# Patient Record
Sex: MALE | Race: BLACK OR AFRICAN AMERICAN | NOT HISPANIC OR LATINO
[De-identification: names, ages, dates, MRNs, and addresses within clinical notes are randomized per-mention and may not be internally consistent; named-entity substitution may affect disease eponyms.]

---

## 2018-02-14 ENCOUNTER — APPOINTMENT (OUTPATIENT)
Dept: UROLOGY | Facility: CLINIC | Age: 83
End: 2018-02-14
Payer: MEDICARE

## 2018-02-14 VITALS
SYSTOLIC BLOOD PRESSURE: 140 MMHG | RESPIRATION RATE: 16 BRPM | TEMPERATURE: 98.3 F | OXYGEN SATURATION: 98 % | BODY MASS INDEX: 20.94 KG/M2 | WEIGHT: 135 LBS | HEIGHT: 67.5 IN | HEART RATE: 74 BPM | DIASTOLIC BLOOD PRESSURE: 80 MMHG

## 2018-02-14 DIAGNOSIS — Z00.00 ENCOUNTER FOR GENERAL ADULT MEDICAL EXAMINATION W/OUT ABNORMAL FINDINGS: ICD-10-CM

## 2018-02-14 PROCEDURE — 99213 OFFICE O/P EST LOW 20 MIN: CPT

## 2018-02-15 LAB — PSA SERPL-MCNC: <0.01 NG/ML

## 2019-05-23 ENCOUNTER — APPOINTMENT (OUTPATIENT)
Dept: UROLOGY | Facility: CLINIC | Age: 84
End: 2019-05-23
Payer: MEDICARE

## 2019-05-23 VITALS
SYSTOLIC BLOOD PRESSURE: 120 MMHG | TEMPERATURE: 97.8 F | WEIGHT: 130 LBS | BODY MASS INDEX: 20.17 KG/M2 | HEIGHT: 67.5 IN | OXYGEN SATURATION: 98 % | DIASTOLIC BLOOD PRESSURE: 88 MMHG | HEART RATE: 84 BPM

## 2019-05-23 PROCEDURE — 99213 OFFICE O/P EST LOW 20 MIN: CPT

## 2019-05-23 NOTE — ASSESSMENT
[FreeTextEntry1] : PSA level has remained very low with slow doubling. He does not have any other significant complaints. PSA level should be checked every 4- 6 months. He will follow up at that time.\par \par Bret Mcmahon MD, FACS\par [facility]\par  of Urology\par \par [address], [address]\[place] [address]\par \par [address], [address]\[place] [address]\par \par Tel: [phone]\par Fax: [phone]

## 2019-05-23 NOTE — LETTER BODY
[Dear  ___] : Dear  [unfilled], [Attached please find my note.] : Attached please find my note. [Thank you very much for allowing me to participate in the care of this patient. If you have any questions, please do not hesitate to contact me] : Thank you very much for allowing me to participate in the care of this patient. If you have any questions, please do not hesitate to contact me. [FreeTextEntry1] : [facility]\par [address] \par [place], [place], [address]  \par [phone] \par \par

## 2019-05-23 NOTE — HISTORY OF PRESENT ILLNESS
[FreeTextEntry1] : Maciel Sequeira is an 83 year old man who was seen for prostate cancer with biochemical recurrence. He was last seen in February 2018. He spends a lot of time back home, in [place]. Nocturia is 2 or 3 times. There is no hematuria, dysuria or flank pain. Last Lupron injection was given in September 2017. PSA level was 0.85 in May 2019. There is no weight loss or bone pain. Lupron injection was started when PSA level reached 8.\par Previous notes: He is on no medications. In 2005, he underwent XRT for Juju 8 prostate cancer.

## 2019-05-23 NOTE — PHYSICAL EXAM
[General Appearance - Well Developed] : well developed [General Appearance - Well Nourished] : well nourished [Normal Appearance] : normal appearance [Well Groomed] : well groomed [General Appearance - In No Acute Distress] : no acute distress [Abdomen Soft] : soft [Abdomen Tenderness] : non-tender [Costovertebral Angle Tenderness] : no ~M costovertebral angle tenderness [Urethral Meatus] : meatus normal [Urinary Bladder Findings] : the bladder was normal on palpation [Scrotum] : the scrotum was normal [Testes Mass (___cm)] : there were no testicular masses [Prostate Absent] : was absent [] : no respiratory distress [Respiration, Rhythm And Depth] : normal respiratory rhythm and effort [Exaggerated Use Of Accessory Muscles For Inspiration] : no accessory muscle use [Oriented To Time, Place, And Person] : oriented to person, place, and time [Affect] : the affect was normal [Mood] : the mood was normal [Not Anxious] : not anxious

## 2020-03-22 ENCOUNTER — INPATIENT (INPATIENT)
Facility: HOSPITAL | Age: 85
LOS: 13 days | Discharge: HOME | End: 2020-04-05
Attending: STUDENT IN AN ORGANIZED HEALTH CARE EDUCATION/TRAINING PROGRAM | Admitting: STUDENT IN AN ORGANIZED HEALTH CARE EDUCATION/TRAINING PROGRAM
Payer: MEDICARE

## 2020-03-22 VITALS
HEART RATE: 88 BPM | RESPIRATION RATE: 16 BRPM | OXYGEN SATURATION: 95 % | SYSTOLIC BLOOD PRESSURE: 92 MMHG | TEMPERATURE: 99 F | DIASTOLIC BLOOD PRESSURE: 63 MMHG

## 2020-03-22 DIAGNOSIS — R74.0 NONSPECIFIC ELEVATION OF LEVELS OF TRANSAMINASE AND LACTIC ACID DEHYDROGENASE [LDH]: ICD-10-CM

## 2020-03-22 DIAGNOSIS — D72.825 BANDEMIA: ICD-10-CM

## 2020-03-22 DIAGNOSIS — Z29.9 ENCOUNTER FOR PROPHYLACTIC MEASURES, UNSPECIFIED: ICD-10-CM

## 2020-03-22 DIAGNOSIS — J18.9 PNEUMONIA, UNSPECIFIED ORGANISM: ICD-10-CM

## 2020-03-22 DIAGNOSIS — E87.1 HYPO-OSMOLALITY AND HYPONATREMIA: ICD-10-CM

## 2020-03-22 DIAGNOSIS — D64.9 ANEMIA, UNSPECIFIED: ICD-10-CM

## 2020-03-22 DIAGNOSIS — D69.6 THROMBOCYTOPENIA, UNSPECIFIED: ICD-10-CM

## 2020-03-22 DIAGNOSIS — G93.41 METABOLIC ENCEPHALOPATHY: ICD-10-CM

## 2020-03-22 DIAGNOSIS — Z02.9 ENCOUNTER FOR ADMINISTRATIVE EXAMINATIONS, UNSPECIFIED: ICD-10-CM

## 2020-03-22 DIAGNOSIS — J39.8 OTHER SPECIFIED DISEASES OF UPPER RESPIRATORY TRACT: ICD-10-CM

## 2020-03-22 LAB
ALBUMIN SERPL ELPH-MCNC: 3.3 G/DL — SIGNIFICANT CHANGE UP (ref 3.3–5)
ALP SERPL-CCNC: 51 U/L — SIGNIFICANT CHANGE UP (ref 40–120)
ALT FLD-CCNC: 20 U/L — SIGNIFICANT CHANGE UP (ref 4–41)
ANION GAP SERPL CALC-SCNC: 13 MMO/L — SIGNIFICANT CHANGE UP (ref 7–14)
AST SERPL-CCNC: 51 U/L — HIGH (ref 4–40)
B PERT DNA SPEC QL NAA+PROBE: NOT DETECTED — SIGNIFICANT CHANGE UP
BASE EXCESS BLDV CALC-SCNC: -0.4 MMOL/L — SIGNIFICANT CHANGE UP
BASOPHILS # BLD AUTO: 0.01 K/UL — SIGNIFICANT CHANGE UP (ref 0–0.2)
BASOPHILS NFR BLD AUTO: 0.2 % — SIGNIFICANT CHANGE UP (ref 0–2)
BASOPHILS NFR SPEC: 0 % — SIGNIFICANT CHANGE UP (ref 0–2)
BILIRUB SERPL-MCNC: 0.3 MG/DL — SIGNIFICANT CHANGE UP (ref 0.2–1.2)
BLASTS # FLD: 0 % — SIGNIFICANT CHANGE UP (ref 0–0)
BLOOD GAS VENOUS - CREATININE: 1.22 MG/DL — SIGNIFICANT CHANGE UP (ref 0.5–1.3)
BUN SERPL-MCNC: 22 MG/DL — SIGNIFICANT CHANGE UP (ref 7–23)
C PNEUM DNA SPEC QL NAA+PROBE: NOT DETECTED — SIGNIFICANT CHANGE UP
CALCIUM SERPL-MCNC: 7.9 MG/DL — LOW (ref 8.4–10.5)
CHLORIDE BLDV-SCNC: 100 MMOL/L — SIGNIFICANT CHANGE UP (ref 96–108)
CHLORIDE SERPL-SCNC: 98 MMOL/L — SIGNIFICANT CHANGE UP (ref 98–107)
CO2 SERPL-SCNC: 20 MMOL/L — LOW (ref 22–31)
CREAT SERPL-MCNC: 1.17 MG/DL — SIGNIFICANT CHANGE UP (ref 0.5–1.3)
EOSINOPHIL # BLD AUTO: 0 K/UL — SIGNIFICANT CHANGE UP (ref 0–0.5)
EOSINOPHIL NFR BLD AUTO: 0 % — SIGNIFICANT CHANGE UP (ref 0–6)
EOSINOPHIL NFR FLD: 0 % — SIGNIFICANT CHANGE UP (ref 0–6)
FLUAV H1 2009 PAND RNA SPEC QL NAA+PROBE: NOT DETECTED — SIGNIFICANT CHANGE UP
FLUAV H1 RNA SPEC QL NAA+PROBE: NOT DETECTED — SIGNIFICANT CHANGE UP
FLUAV H3 RNA SPEC QL NAA+PROBE: NOT DETECTED — SIGNIFICANT CHANGE UP
FLUAV SUBTYP SPEC NAA+PROBE: NOT DETECTED — SIGNIFICANT CHANGE UP
FLUBV RNA SPEC QL NAA+PROBE: NOT DETECTED — SIGNIFICANT CHANGE UP
GAS PNL BLDV: 130 MMOL/L — LOW (ref 136–146)
GIANT PLATELETS BLD QL SMEAR: PRESENT — SIGNIFICANT CHANGE UP
GLUCOSE BLDV-MCNC: 113 MG/DL — HIGH (ref 70–99)
GLUCOSE SERPL-MCNC: 112 MG/DL — HIGH (ref 70–99)
HADV DNA SPEC QL NAA+PROBE: NOT DETECTED — SIGNIFICANT CHANGE UP
HCO3 BLDV-SCNC: 24 MMOL/L — SIGNIFICANT CHANGE UP (ref 20–27)
HCOV PNL SPEC NAA+PROBE: SIGNIFICANT CHANGE UP
HCT VFR BLD CALC: 37.4 % — LOW (ref 39–50)
HCT VFR BLDV CALC: 40.5 % — SIGNIFICANT CHANGE UP (ref 39–51)
HGB BLD-MCNC: 12.6 G/DL — LOW (ref 13–17)
HGB BLDV-MCNC: 13.2 G/DL — SIGNIFICANT CHANGE UP (ref 13–17)
HMPV RNA SPEC QL NAA+PROBE: NOT DETECTED — SIGNIFICANT CHANGE UP
HPIV1 RNA SPEC QL NAA+PROBE: NOT DETECTED — SIGNIFICANT CHANGE UP
HPIV2 RNA SPEC QL NAA+PROBE: NOT DETECTED — SIGNIFICANT CHANGE UP
HPIV3 RNA SPEC QL NAA+PROBE: NOT DETECTED — SIGNIFICANT CHANGE UP
HPIV4 RNA SPEC QL NAA+PROBE: NOT DETECTED — SIGNIFICANT CHANGE UP
IMM GRANULOCYTES NFR BLD AUTO: 0.4 % — SIGNIFICANT CHANGE UP (ref 0–1.5)
LACTATE BLDV-MCNC: 1.4 MMOL/L — SIGNIFICANT CHANGE UP (ref 0.5–2)
LYMPHOCYTES # BLD AUTO: 0.42 K/UL — LOW (ref 1–3.3)
LYMPHOCYTES # BLD AUTO: 8.5 % — LOW (ref 13–44)
LYMPHOCYTES NFR SPEC AUTO: 1.8 % — LOW (ref 13–44)
MAGNESIUM SERPL-MCNC: 1.9 MG/DL — SIGNIFICANT CHANGE UP (ref 1.6–2.6)
MCHC RBC-ENTMCNC: 26.4 PG — LOW (ref 27–34)
MCHC RBC-ENTMCNC: 33.7 % — SIGNIFICANT CHANGE UP (ref 32–36)
MCV RBC AUTO: 78.4 FL — LOW (ref 80–100)
METAMYELOCYTES # FLD: 0 % — SIGNIFICANT CHANGE UP (ref 0–1)
MONOCYTES # BLD AUTO: 0.25 K/UL — SIGNIFICANT CHANGE UP (ref 0–0.9)
MONOCYTES NFR BLD AUTO: 5.1 % — SIGNIFICANT CHANGE UP (ref 2–14)
MONOCYTES NFR BLD: 2.7 % — SIGNIFICANT CHANGE UP (ref 2–9)
MYELOCYTES NFR BLD: 0 % — SIGNIFICANT CHANGE UP (ref 0–0)
NEUTROPHIL AB SER-ACNC: 86.5 % — HIGH (ref 43–77)
NEUTROPHILS # BLD AUTO: 4.25 K/UL — SIGNIFICANT CHANGE UP (ref 1.8–7.4)
NEUTROPHILS NFR BLD AUTO: 85.8 % — HIGH (ref 43–77)
NEUTS BAND # BLD: 7.2 % — HIGH (ref 0–6)
NRBC # FLD: 0 K/UL — SIGNIFICANT CHANGE UP (ref 0–0)
NT-PROBNP SERPL-SCNC: 245.9 PG/ML — SIGNIFICANT CHANGE UP
OTHER - HEMATOLOGY %: 0 — SIGNIFICANT CHANGE UP
OVALOCYTES BLD QL SMEAR: SLIGHT — SIGNIFICANT CHANGE UP
PCO2 BLDV: 36 MMHG — LOW (ref 41–51)
PH BLDV: 7.43 PH — SIGNIFICANT CHANGE UP (ref 7.32–7.43)
PHOSPHATE SERPL-MCNC: 3.5 MG/DL — SIGNIFICANT CHANGE UP (ref 2.5–4.5)
PLATELET # BLD AUTO: 104 K/UL — LOW (ref 150–400)
PLATELET COUNT - ESTIMATE: SIGNIFICANT CHANGE UP
PMV BLD: 11.9 FL — SIGNIFICANT CHANGE UP (ref 7–13)
PO2 BLDV: 43 MMHG — HIGH (ref 35–40)
POIKILOCYTOSIS BLD QL AUTO: SIGNIFICANT CHANGE UP
POTASSIUM BLDV-SCNC: 3.8 MMOL/L — SIGNIFICANT CHANGE UP (ref 3.4–4.5)
POTASSIUM SERPL-MCNC: 4 MMOL/L — SIGNIFICANT CHANGE UP (ref 3.5–5.3)
POTASSIUM SERPL-SCNC: 4 MMOL/L — SIGNIFICANT CHANGE UP (ref 3.5–5.3)
PROMYELOCYTES # FLD: 0 % — SIGNIFICANT CHANGE UP (ref 0–0)
PROT SERPL-MCNC: 6.4 G/DL — SIGNIFICANT CHANGE UP (ref 6–8.3)
RBC # BLD: 4.77 M/UL — SIGNIFICANT CHANGE UP (ref 4.2–5.8)
RBC # FLD: 14.7 % — HIGH (ref 10.3–14.5)
REVIEW TO FOLLOW: YES — SIGNIFICANT CHANGE UP
RSV RNA SPEC QL NAA+PROBE: NOT DETECTED — SIGNIFICANT CHANGE UP
RV+EV RNA SPEC QL NAA+PROBE: NOT DETECTED — SIGNIFICANT CHANGE UP
SAO2 % BLDV: 79.2 % — SIGNIFICANT CHANGE UP (ref 60–85)
SODIUM SERPL-SCNC: 131 MMOL/L — LOW (ref 135–145)
VARIANT LYMPHS # BLD: 1.8 % — SIGNIFICANT CHANGE UP
WBC # BLD: 4.95 K/UL — SIGNIFICANT CHANGE UP (ref 3.8–10.5)
WBC # FLD AUTO: 4.95 K/UL — SIGNIFICANT CHANGE UP (ref 3.8–10.5)

## 2020-03-22 PROCEDURE — 99223 1ST HOSP IP/OBS HIGH 75: CPT

## 2020-03-22 RX ORDER — HEPARIN SODIUM 5000 [USP'U]/ML
5000 INJECTION INTRAVENOUS; SUBCUTANEOUS EVERY 8 HOURS
Refills: 0 | Status: DISCONTINUED | OUTPATIENT
Start: 2020-03-22 | End: 2020-04-05

## 2020-03-22 RX ORDER — CEFTRIAXONE 500 MG/1
1000 INJECTION, POWDER, FOR SOLUTION INTRAMUSCULAR; INTRAVENOUS EVERY 24 HOURS
Refills: 0 | Status: DISCONTINUED | OUTPATIENT
Start: 2020-03-22 | End: 2020-03-22

## 2020-03-22 RX ORDER — AZITHROMYCIN 500 MG/1
500 TABLET, FILM COATED ORAL DAILY
Refills: 0 | Status: DISCONTINUED | OUTPATIENT
Start: 2020-03-22 | End: 2020-03-22

## 2020-03-22 RX ORDER — SODIUM CHLORIDE 9 MG/ML
1000 INJECTION INTRAMUSCULAR; INTRAVENOUS; SUBCUTANEOUS
Refills: 0 | Status: DISCONTINUED | OUTPATIENT
Start: 2020-03-22 | End: 2020-03-30

## 2020-03-22 RX ADMIN — HEPARIN SODIUM 5000 UNIT(S): 5000 INJECTION INTRAVENOUS; SUBCUTANEOUS at 21:59

## 2020-03-22 RX ADMIN — CEFTRIAXONE 100 MILLIGRAM(S): 500 INJECTION, POWDER, FOR SOLUTION INTRAMUSCULAR; INTRAVENOUS at 17:37

## 2020-03-22 RX ADMIN — AZITHROMYCIN 500 MILLIGRAM(S): 500 TABLET, FILM COATED ORAL at 17:37

## 2020-03-22 RX ADMIN — SODIUM CHLORIDE 50 MILLILITER(S): 9 INJECTION INTRAMUSCULAR; INTRAVENOUS; SUBCUTANEOUS at 19:48

## 2020-03-22 NOTE — ED ADULT NURSE NOTE - OBJECTIVE STATEMENT
pt is a 85 yr old male aaox 2, biba for sob. pt appears weak and pleasantly confused at times. rr 30s, otherwise vss, breathing well on room air. piv placed, labs sent. pt resting, awaiting xray. denies all complaints.

## 2020-03-22 NOTE — H&P ADULT - NSHPREVIEWOFSYSTEMS_GEN_ALL_CORE
REVIEW OF SYSTEMS:    CONSTITUTIONAL: generalized weakness, fevers   HEAD: no headache   EYES/ENT: No visual changes;  No vertigo or throat pain   NECK: No pain or stiffness  RESPIRATORY: No cough, wheezing, hemoptysis; No shortness of breath  CARDIOVASCULAR: No chest pain or palpitations  GASTROINTESTINAL: see HPI  GENITOURINARY: No dysuria, frequency or hematuria  NEUROLOGICAL: No numbness or weakness  SKIN: No itching, burning, rashes, or lesions   MUSCULOSKEL: no pain  All other review of systems is negative unless indicated above.

## 2020-03-22 NOTE — H&P ADULT - PROBLEM SELECTOR PLAN 1
- patient with CXR showing b/l lung opacities concerning for PNA, possibly bacterial given 7% bandemia  - cover with ceftriaxone and azithro for CAP, f/u urine legionella  - if COVID-19 positive, PNA can be 2/2 to COVID-19  - currently off supplemental O2, can use nasal cannula or nonrebreather. No high-flow or BIPAP given COVID rule out if needed. Low threshold to consult MICU if in respiratory distress - patient with CXR showing b/l lung opacities concerning for PNA, possibly bacterial given 7% bandemia, however could be viral 2/2 COVID-19  - cover with ceftriaxone and azithro for CAP, f/u urine legionella  - if COVID-19 positive, PNA can be 2/2 to COVID-19  - currently off supplemental O2, can use nasal cannula or nonrebreather. No high-flow or BIPAP given COVID rule out if needed. Low threshold to consult MICU if in respiratory distress - patient with CXR showing b/l lung opacities concerning for PNA, possibly bacterial given 7% bandemia, however could be viral 2/2 COVID-19  - s/p ceftriaxone and azithro for CAP, f/u urine legionella. Hold off on abx until blood cultures.   - if COVID-19 positive, PNA can be 2/2 to COVID-19  - currently off supplemental O2, can use nasal cannula or nonrebreather. No high-flow or BIPAP given COVID rule out if needed. Low threshold to consult MICU if in respiratory distress

## 2020-03-22 NOTE — ED PROVIDER NOTE - NS ED ROS FT
Limited 2/2 AMS    CONSTITUTIONAL: No weakness, fevers    EYES/ENT: No throat pain  NECK: No pain or stiffness  RESPIRATORY: No cough, wheezing, No shortness of breath  CARDIOVASCULAR: No chest pain or palpitations  GASTROINTESTINAL: No abdominal or epigastric pain. No nausea, vomiting, No diarrhea or constipation  GENITOURINARY: No dysuria, frequency or hematuria  NEUROLOGICAL: No numbness or weakness  SKIN: No itching, rashes

## 2020-03-22 NOTE — H&P ADULT - PROBLEM SELECTOR PLAN 5
- patient with microcytic anemia possibly iron deficiency vs anemia of chronic disease. No concern for acute bleed.  - f/u iron studies  - continue to trend - patient with elevated AST at 51 in the setting of infection, possibly COVID  - f/u covid, trend LFTs

## 2020-03-22 NOTE — H&P ADULT - HISTORY OF PRESENT ILLNESS
85 M with PMH of prostate CA on lupron, GERD, presenting from home with fevers, weakness and altered mental status since yesterday. Patient says he was doing okay at home however his daughter felt like he wasn't at baseline so sent him in. History obtained from daughter Kayla () who states that patient was confused, had decreased po intake, and an episode of urinary incontinence. Patient also had an episode of diarrhea and found to have a temperature of 104 at home. Patient did not have any cough, shortness of breath or chest pain. No nausea, vomiting, abdominal pain. Patient lives with son and brother. No sick contacts however patient returned from [place] on 3/4.     In the ED, patient found to have T 98.7, HR 88, BP 92/63 ->115/59, RR 16 satting 95% on room air. Patient became tachypneic to 27-30 satting 94-95% on room air.

## 2020-03-22 NOTE — H&P ADULT - PROBLEM SELECTOR PLAN 8
- DVT ppx: HSQ  - Diet: regular  - Dispo: pending clinical improvement, PT consult - hyponatremia to 131 in the setting of poor po intake vs PNA  - continue to monitor  - if downtrending, consider urine studies - hyponatremia to 131 in the setting of poor po intake vs PNA  - continue to monitor  - if downtrending, consider urine studies  - IVF 50cc/hr for 10 hours

## 2020-03-22 NOTE — H&P ADULT - PROBLEM SELECTOR PLAN 7
- hyponatremia to 131 in the setting of poor po intake vs PNA  - continue to monitor  - if downtrending, consider urine studies - patient with low platelet of 104, however no concern for bleeding at this time  - most likely in the setting of infection  - continue to trend

## 2020-03-22 NOTE — H&P ADULT - PROBLEM SELECTOR PLAN 6
- patient with low platelet of 104, however no concern for bleeding at this time  - most likely in the setting of infection  - continue to trend - patient with microcytic anemia possibly iron deficiency vs anemia of chronic disease. No concern for acute bleed.  - f/u iron studies  - continue to trend

## 2020-03-22 NOTE — H&P ADULT - NSHPPHYSICALEXAM_GEN_ALL_CORE
PHYSICAL EXAM:    Vital Signs Last 24 Hrs  T(C): 37.1 (22 Mar 2020 10:26), Max: 37.1 (22 Mar 2020 10:26)  T(F): 98.7 (22 Mar 2020 10:26), Max: 98.7 (22 Mar 2020 10:26)  HR: 73 (22 Mar 2020 14:24) (73 - 88)  BP: 115/59 (22 Mar 2020 13:13) (92/63 - 115/59)  BP(mean): 75 (22 Mar 2020 13:13) (75 - 75)  RR: 30 (22 Mar 2020 14:24) (16 - 30)  SpO2: 95% (22 Mar 2020 14:24) (94% - 95%)    General: No acute distress on room air   HEENT: NCAT. No scleral icterus or injection.  Moist MM.  No oropharyngeal exudates.    Neck: Supple.  Full ROM.  No JVD.    Heart: unable to assess without disposable stethoscope   Lungs: unable to assess without disposable stethoscope   Abdomen: BS+, soft, NT/ND.   Skin: Warm and dry.  No rashes.  Extremities: No edema, clubbing, or cyanosis.   Musculoskeletal: No deformities.    Neuro: A&Ox3, moving all extremities, no neuro focal deficits

## 2020-03-22 NOTE — H&P ADULT - PROBLEM SELECTOR PLAN 2
- patient with altered mental status compared to baseline, now improving  - most likely in the setting of PNA  - continue antibiotic CAP coverage  - monitor electrolytes - patient with altered mental status compared to baseline, now improving  - most likely in the setting of PNA 2/2 COVID  - hold off on abx for now  - monitor electrolytes

## 2020-03-22 NOTE — ED PROVIDER NOTE - OBJECTIVE STATEMENT
Pt is an 86 yo M w/ PMHx prostate CA on lupron, GERD BIBEMS from home with fevers, generalized weakness, AMS since yesterday. Unable to obtain hx from pt as AAOx2, does not remember why he is in the ED. Per daughter Kayla (), pt appeared more confused since yesterday; at baseline he is AAOx3. He has also had decreased po and had an episode of incontinence yesterday which is unusual for him. Lives with son and brother, who states he was unable to get out of the bad this am. This am he also had an episode of diarrhea and was found to have a temp to 104 F.  No reported sick contacts but returned from [place] and [facility] on 3/4.

## 2020-03-22 NOTE — H&P ADULT - PROBLEM SELECTOR PLAN 9
Transitions of Care Status:  1.  Name of PCP:  2.  PCP Contacted on Admission: [ ] Y    [ ] N    3.  PCP contacted at Discharge: [ ] Y    [ ] N    [ ] N/A  4.  Post-Discharge Appointment Date and Location:  5.  Summary of Handoff given to PCP: - DVT ppx: HSQ  - Diet: regular  - Dispo: pending clinical improvement, PT consult - DVT ppx: HSQ  - Diet: regular  - Dispo: pending clinical improvement, hold off on PT consult

## 2020-03-22 NOTE — H&P ADULT - NSHPLABSRESULTS_GEN_ALL_CORE
CBC Full  -  ( 22 Mar 2020 12:42 )  WBC Count : 4.95 K/uL  Hemoglobin : 12.6 g/dL  Hematocrit : 37.4 %  Platelet Count - Automated : 104 K/uL  Mean Cell Volume : 78.4 fL  Mean Cell Hemoglobin : 26.4 pg  Mean Cell Hemoglobin Concentration : 33.7 %  Auto Neutrophil # : 4.25 K/uL  Auto Lymphocyte # : 0.42 K/uL  Auto Monocyte # : 0.25 K/uL  Auto Eosinophil # : 0.00 K/uL  Auto Basophil # : 0.01 K/uL  Auto Neutrophil % : 85.8 %  Auto Lymphocyte % : 8.5 %  Auto Monocyte % : 5.1 %  Auto Eosinophil % : 0.0 %  Auto Basophil % : 0.2 %    03-22    131<L>  |  98  |  22  ----------------------------<  112<H>  4.0   |  20<L>  |  1.17    Ca    7.9<L>      22 Mar 2020 12:42  Phos  3.5     03-22  Mg     1.9     03-22    TPro  6.4  /  Alb  3.3  /  TBili  0.3  /  DBili  x   /  AST  51<H>  /  ALT  20  /  AlkPhos  51  03-22    LIVER FUNCTIONS - ( 22 Mar 2020 12:42 )  Alb: 3.3 g/dL / Pro: 6.4 g/dL / ALK PHOS: 51 u/L / ALT: 20 u/L / AST: 51 u/L / GGT: x           < from: Xray Chest 1 View AP/PA (03.22.20 @ 15:25) >      INTERPRETATION:  Leftward deviation of the trachea with an associated opacity in the upper mid chest. Findings are nonspecific and may be due to an enlarged thyroid gland. Correlation with prior imaging be helpful. A CT of the chest may be obtained for further evaluation.    Bilateral lower lung faint peripheral opacities, right greater than left. Findings are suspicious for infection.      < end of copied text >

## 2020-03-22 NOTE — ED PROVIDER NOTE - ATTENDING CONTRIBUTION TO CARE
84 yo M w/ PMHx prostate CA on lupron, GERD BIBEMS from home with fevers, generalized weakness, AMS since yesterday. Tachypneic, concern for PNA. Will obtain CBC, CMP, VBG, CXR. concern for covid   GEN - NAD; well appearing; A+O x2   HEAD - NC/AT     EYES - EOMI, no conjunctival pallor, no scleral icterus  ENT -   mucous membranes  moist , no discharge      NECK - Neck supple  PULM -diminished bilaterally, rales at bases   COR -  RRR, S1 S2, no murmurs  ABD - , ND, NT, soft, no guarding, no rebound, no masses    BACK - no CVA tenderness, nontender spine     EXTREMS - no edema, no deformity, warm and well perfused    SKIN - no rash or bruising      NEUROLOGIC - alert, sensation nl, motor 5/5 RUE/LUE/RLE/LLE

## 2020-03-22 NOTE — ED PROVIDER NOTE - CLINICAL SUMMARY MEDICAL DECISION MAKING FREE TEXT BOX
Pt is an 86 yo M w/ PMHx prostate CA on lupron, GERD BIBEMS from home with fevers, generalized weakness, AMS since yesterday. Tachypneic, concern for PNA. Will obtain CBC, CMP, VBG, CXR.

## 2020-03-22 NOTE — H&P ADULT - PROBLEM SELECTOR PLAN 3
- patient with 7% bandemia with lymphopenia concerning for infection  - continue ceftriaxone/azithro for CAP, f/u COVID-19 PCR  - lymphopenia possibly 2/2 COVID  - continue to trend - patient with 7% bandemia with lymphopenia concerning for infection  - f/u COVID-19 PCR, blood cultures, UA  - lymphopenia possibly 2/2 COVID  - continue to trend

## 2020-03-22 NOTE — ED ADULT TRIAGE NOTE - CHIEF COMPLAINT QUOTE
Pt brought in by EMS from home complaining of fever, chills, and body aches. Pt denies chest pain, sob.

## 2020-03-22 NOTE — H&P ADULT - ASSESSMENT
85 M with PMH of prostate CA on lupron, GERD, presenting from home with fevers, diarrhea, altered mental status found to have b/l opacities on CXR concerning for PNA, pending COVID-19 rule out.

## 2020-03-22 NOTE — H&P ADULT - ATTENDING COMMENTS
85M with prostate ca presented with fever and confusion at home. patient denied any symptoms. afebrile in ED. reported 104 fever at home. cxr with b/l infiltrates. tachypneic, no hypoxia. no overt distress on exam. lung w/ bibasilar dry rales. concern for COVID19. c/w CAP coverage. monitor respiratory status, supportive management. pending COVID PCR.

## 2020-03-22 NOTE — ED PROVIDER NOTE - PHYSICAL EXAMINATION
GENERAL: NAD, on 2L NC  HEAD:  Atraumatic, Normocephalic  EYES: PERRLA, conjunctiva and sclera clear  ENMT: Moist mucous membranes  NECK: Supple   HEART: Regular rate and rhythm; No murmurs   RESPIRATORY: CTA B/L, No W/R/R  ABDOMEN: Soft, Nontender, Nondistended; Bowel sounds present  NEUROLOGY: A&Ox2 (person, place but not date), nonfocal, moving all extremities  EXTREMITIES:  2+ Peripheral Pulses, No clubbing, cyanosis, or edema  SKIN: warm, dry, normal color

## 2020-03-23 ENCOUNTER — TRANSCRIPTION ENCOUNTER (OUTPATIENT)
Age: 85
End: 2020-03-23

## 2020-03-23 LAB
-  CANDIDA PARAPSILOSIS: SIGNIFICANT CHANGE UP
-  COAGULASE NEGATIVE STAPHYLOCOCCUS: SIGNIFICANT CHANGE UP
ANION GAP SERPL CALC-SCNC: 14 MMO/L — SIGNIFICANT CHANGE UP (ref 7–14)
BASOPHILS # BLD AUTO: 0 K/UL — SIGNIFICANT CHANGE UP (ref 0–0.2)
BASOPHILS NFR BLD AUTO: 0 % — SIGNIFICANT CHANGE UP (ref 0–2)
BUN SERPL-MCNC: 19 MG/DL — SIGNIFICANT CHANGE UP (ref 7–23)
CALCIUM SERPL-MCNC: 8.3 MG/DL — LOW (ref 8.4–10.5)
CHLORIDE SERPL-SCNC: 98 MMOL/L — SIGNIFICANT CHANGE UP (ref 98–107)
CO2 SERPL-SCNC: 23 MMOL/L — SIGNIFICANT CHANGE UP (ref 22–31)
CREAT SERPL-MCNC: 0.93 MG/DL — SIGNIFICANT CHANGE UP (ref 0.5–1.3)
EOSINOPHIL # BLD AUTO: 0 K/UL — SIGNIFICANT CHANGE UP (ref 0–0.5)
EOSINOPHIL NFR BLD AUTO: 0 % — SIGNIFICANT CHANGE UP (ref 0–6)
GLUCOSE SERPL-MCNC: 113 MG/DL — HIGH (ref 70–99)
GRAM STN FLD: SIGNIFICANT CHANGE UP
HCT VFR BLD CALC: 42.2 % — SIGNIFICANT CHANGE UP (ref 39–50)
HGB BLD-MCNC: 14.2 G/DL — SIGNIFICANT CHANGE UP (ref 13–17)
IMM GRANULOCYTES NFR BLD AUTO: 0.3 % — SIGNIFICANT CHANGE UP (ref 0–1.5)
LYMPHOCYTES # BLD AUTO: 0.47 K/UL — LOW (ref 1–3.3)
LYMPHOCYTES # BLD AUTO: 12.9 % — LOW (ref 13–44)
MAGNESIUM SERPL-MCNC: 1.9 MG/DL — SIGNIFICANT CHANGE UP (ref 1.6–2.6)
MCHC RBC-ENTMCNC: 26.7 PG — LOW (ref 27–34)
MCHC RBC-ENTMCNC: 33.6 % — SIGNIFICANT CHANGE UP (ref 32–36)
MCV RBC AUTO: 79.3 FL — LOW (ref 80–100)
METHOD TYPE: SIGNIFICANT CHANGE UP
MONOCYTES # BLD AUTO: 0.26 K/UL — SIGNIFICANT CHANGE UP (ref 0–0.9)
MONOCYTES NFR BLD AUTO: 7.1 % — SIGNIFICANT CHANGE UP (ref 2–14)
NEUTROPHILS # BLD AUTO: 2.91 K/UL — SIGNIFICANT CHANGE UP (ref 1.8–7.4)
NEUTROPHILS NFR BLD AUTO: 79.7 % — HIGH (ref 43–77)
NRBC # FLD: 0 K/UL — SIGNIFICANT CHANGE UP (ref 0–0)
PHOSPHATE SERPL-MCNC: 2.6 MG/DL — SIGNIFICANT CHANGE UP (ref 2.5–4.5)
PLATELET # BLD AUTO: 135 K/UL — LOW (ref 150–400)
PMV BLD: 11.3 FL — SIGNIFICANT CHANGE UP (ref 7–13)
POTASSIUM SERPL-MCNC: 4.4 MMOL/L — SIGNIFICANT CHANGE UP (ref 3.5–5.3)
POTASSIUM SERPL-SCNC: 4.4 MMOL/L — SIGNIFICANT CHANGE UP (ref 3.5–5.3)
RBC # BLD: 5.32 M/UL — SIGNIFICANT CHANGE UP (ref 4.2–5.8)
RBC # FLD: 14.8 % — HIGH (ref 10.3–14.5)
SODIUM SERPL-SCNC: 135 MMOL/L — SIGNIFICANT CHANGE UP (ref 135–145)
SPECIMEN SOURCE: SIGNIFICANT CHANGE UP
WBC # BLD: 3.65 K/UL — LOW (ref 3.8–10.5)
WBC # FLD AUTO: 3.65 K/UL — LOW (ref 3.8–10.5)

## 2020-03-23 PROCEDURE — 99233 SBSQ HOSP IP/OBS HIGH 50: CPT

## 2020-03-23 PROCEDURE — 93010 ELECTROCARDIOGRAM REPORT: CPT

## 2020-03-23 RX ORDER — VANCOMYCIN HCL 1 G
1000 VIAL (EA) INTRAVENOUS ONCE
Refills: 0 | Status: COMPLETED | OUTPATIENT
Start: 2020-03-23 | End: 2020-03-23

## 2020-03-23 RX ORDER — CEFTRIAXONE 500 MG/1
2000 INJECTION, POWDER, FOR SOLUTION INTRAMUSCULAR; INTRAVENOUS EVERY 12 HOURS
Refills: 0 | Status: DISCONTINUED | OUTPATIENT
Start: 2020-03-23 | End: 2020-03-24

## 2020-03-23 RX ORDER — VANCOMYCIN HCL 1 G
1000 VIAL (EA) INTRAVENOUS EVERY 24 HOURS
Refills: 0 | Status: DISCONTINUED | OUTPATIENT
Start: 2020-03-24 | End: 2020-03-24

## 2020-03-23 RX ORDER — VANCOMYCIN HCL 1 G
VIAL (EA) INTRAVENOUS
Refills: 0 | Status: DISCONTINUED | OUTPATIENT
Start: 2020-03-23 | End: 2020-03-24

## 2020-03-23 RX ORDER — AZITHROMYCIN 500 MG/1
500 TABLET, FILM COATED ORAL DAILY
Refills: 0 | Status: DISCONTINUED | OUTPATIENT
Start: 2020-03-23 | End: 2020-03-24

## 2020-03-23 RX ORDER — ACETAMINOPHEN 500 MG
650 TABLET ORAL EVERY 6 HOURS
Refills: 0 | Status: DISCONTINUED | OUTPATIENT
Start: 2020-03-23 | End: 2020-04-05

## 2020-03-23 RX ORDER — LACTOBACILLUS ACIDOPHILUS 100MM CELL
1 CAPSULE ORAL
Refills: 0 | Status: DISCONTINUED | OUTPATIENT
Start: 2020-03-23 | End: 2020-04-05

## 2020-03-23 RX ADMIN — Medication 1 TABLET(S): at 17:45

## 2020-03-23 RX ADMIN — CEFTRIAXONE 100 MILLIGRAM(S): 500 INJECTION, POWDER, FOR SOLUTION INTRAMUSCULAR; INTRAVENOUS at 17:44

## 2020-03-23 RX ADMIN — HEPARIN SODIUM 5000 UNIT(S): 5000 INJECTION INTRAVENOUS; SUBCUTANEOUS at 13:03

## 2020-03-23 RX ADMIN — Medication 250 MILLIGRAM(S): at 17:44

## 2020-03-23 RX ADMIN — Medication 650 MILLIGRAM(S): at 22:18

## 2020-03-23 RX ADMIN — HEPARIN SODIUM 5000 UNIT(S): 5000 INJECTION INTRAVENOUS; SUBCUTANEOUS at 22:19

## 2020-03-23 RX ADMIN — HEPARIN SODIUM 5000 UNIT(S): 5000 INJECTION INTRAVENOUS; SUBCUTANEOUS at 07:26

## 2020-03-23 RX ADMIN — AZITHROMYCIN 500 MILLIGRAM(S): 500 TABLET, FILM COATED ORAL at 18:22

## 2020-03-23 RX ADMIN — Medication 650 MILLIGRAM(S): at 23:52

## 2020-03-23 NOTE — PROGRESS NOTE ADULT - PROBLEM SELECTOR PLAN 5
- patient with elevated AST at 51 in the setting of infection, possibly COVID  - f/u covid, trend LFTs

## 2020-03-23 NOTE — CHART NOTE - NSCHARTNOTEFT_GEN_A_CORE
Spoke w/ pt's daughter Kayla () updated w/ pt's condition, test results and POC, all questions answered.    Neris Rodriguez, [facility] [id number]

## 2020-03-23 NOTE — DISCHARGE NOTE PROVIDER - HOSPITAL COURSE
85 M with PMH of prostate CA on lupron, GERD, presenting from home with fevers, weakness and altered mental status 85 M with PMH of prostate CA on lupron, GERD, presenting from home with fevers, weakness and altered mental status         Hospital Course    Pneumonia    - CXR w/ b/l lung opacities; [place] neg    - COVID-19 (+) 3/22    - s/p CTX/Azithro, received Plaquenil    - Received Supplemental O2 PRN        Positive blood culture    - BCx (+) CoNS & Candida parapsilosis     - ID consulted --> s/p fluconazole, started on caspofungin    - Rpt BCx (pending)-------    - CoNS likely contaminant        Bandemia    - BCx (+) CoNS & Candida        Metabolic encephalopathy.      - Likely in the setting of PNA 2/2 COVID and fungemia     - Improved         Tracheal deviation.      - CXR w/ leftward deviation of trachea which may be due to an enlarged thyroid gland        Transaminitis.      - Likely 2/2 COVID    - Trended LFT        Anemia.    - Trend          Hyponatremia.      -  Likely 2/2 poor PO intake vs PNA    - s/p IVF    -Improved         Dispo- On ___ this case was reviewed with  ____, the patient is medically stable and optimized for discharge. All medications were reviewed and prescriptions were sent to mutually agreed upon pharmacy. 85 M with PMH of prostate CA on lupron, GERD, presenting from home with fevers, weakness and altered mental status         Hospital Course    Pneumonia    - CXR w/ b/l lung opacities; Ulegionella neg    - COVID-19 (+) 3/22    - s/p CTX/Azithro, received Plaquenil    - Received Supplemental O2 PRN        Positive blood culture    - BCx (+) CoNS & Candida parapsilosis, likely contaminant    - ID consulted --> s/p fluconazole, started on caspofungin    - Bcx 3/25 neg         Metabolic encephalopathy.      - Likely in the setting of PNA 2/2 COVID and fungemia     - Improved         Tracheal deviation.      - CXR w/ leftward deviation of trachea which may be due to an enlarged thyroid gland        Transaminitis.      - Likely 2/2 COVID    - Trended LFT             Dispo- On 3/30/20 this case was reviewed with Dr. Fu, the patient is medically stable and optimized for discharge. All medications were reviewed and prescriptions were sent to mutually agreed upon pharmacy. 85 M with PMH of prostate CA on lupron, GERD, presenting from home with fevers, weakness and altered mental status         Pneumonia    - CXR w/ b/l lung opacities; Ulegionella neg    - COVID-19 (+) 3/22    - s/p CTX/Azithro, received Plaquenil    - Received Supplemental O2 PRN        Positive blood culture    - BCx (+) CoNS & Candida parapsilosis, likely contaminant    - ID consulted --> s/p fluconazole, started on caspofungin    - Bcx 3/25 neg         Metabolic encephalopathy.      - Likely in the setting of PNA 2/2 COVID and fungemia     - Improved         Tracheal deviation.      - CXR w/ leftward deviation of trachea which may be due to an enlarged thyroid gland        Transaminitis.      - Likely 2/2 COVID    - Trended LFT             Dispo- Pt is able to take PO meds, spoke w/ daughter Kayal [phone], willing to take the pt home. CM to arrange transport home. Pt is optimized for discharge

## 2020-03-23 NOTE — DISCHARGE NOTE PROVIDER - NSFOLLOWUPCLINICS_GEN_ALL_ED_FT
[facility]  OB/GYN & Surg  [address] [place] [id number]  Phone: [phone]  Fax:     [facility]  Ophthalmology  [address] [place] [address]  Phone: [phone]  Fax:   Follow Up Time:

## 2020-03-23 NOTE — PROGRESS NOTE ADULT - PROBLEM SELECTOR PLAN 3
- patient with 7% bandemia with lymphopenia concerning for infection  - f/u COVID-19 PCR, blood cultures, UA  - lymphopenia possibly 2/2 COVID  - continue to trend  - f/u labs - patient with 7% bandemia with lymphopenia concerning for infection  - f/u COVID-19 PCR, blood cultures, UA  - lymphopenia possibly 2/2 COVID however will cont on abx for now  - continue to trend  - f/u labs

## 2020-03-23 NOTE — PROGRESS NOTE ADULT - PROBLEM SELECTOR PLAN 2
- patient with altered mental status compared to baseline, now improving  - most likely in the setting of PNA 2/2 COVID  - hold off on abx for now  - monitor electrolytes

## 2020-03-23 NOTE — DISCHARGE NOTE PROVIDER - NSDCCPCAREPLAN_GEN_ALL_CORE_FT
PRINCIPAL DISCHARGE DIAGNOSIS  Diagnosis: Pneumonia  Assessment and Plan of Treatment:       SECONDARY DISCHARGE DIAGNOSES  Diagnosis: Tracheal deviation  Assessment and Plan of Treatment: Tracheal deviation.    - CXR showed leftward deviation of trachea which may be due to an enlarged thyroid gland   you will need thyroid ultrasound for further evaluation and management after COVID results back. PRINCIPAL DISCHARGE DIAGNOSIS  Diagnosis: Pneumonia  Assessment and Plan of Treatment: Complete antibiotic therapy as directed.  Monitor for any further signs and symptoms of further infection including but not limited to fevers, rigors, chills and or difficulty breathing.        SECONDARY DISCHARGE DIAGNOSES  Diagnosis: Anemia  Assessment and Plan of Treatment: Follow-up with your outpatient provider if further treatment is warranted. Monitor for signs/symptoms indicating worsening of disease, such as, easy bleeding/bruising, pale skin, fatigue, dizziness, increased heart rate, or chest pain.      Diagnosis: Thrombocytopenia  Assessment and Plan of Treatment: Improved. Please have your labs repeated with your PCP upon discharge    Diagnosis: Hyponatremia  Assessment and Plan of Treatment: Resolved. Please have your labs repeated with your PCP upon discharge    Diagnosis: Tracheal deviation  Assessment and Plan of Treatment: Tracheal deviation.    - CXR showed leftward deviation of trachea which may be due to an enlarged thyroid gland   you will need thyroid ultrasound for further evaluation upon discharge    Diagnosis: Metabolic encephalopathy  Assessment and Plan of Treatment: Likely due to COVID infection. This improved during your hospital stay. PRINCIPAL DISCHARGE DIAGNOSIS  Diagnosis: Pneumonia  Assessment and Plan of Treatment: Complete antibiotic therapy as directed.  Monitor for any further signs and symptoms of further infection including but not limited to fevers, rigors, chills and or difficulty breathing.        SECONDARY DISCHARGE DIAGNOSES  Diagnosis: Anemia  Assessment and Plan of Treatment: Follow-up with your outpatient provider if further treatment is warranted. Monitor for signs/symptoms indicating worsening of disease, such as, easy bleeding/bruising, pale skin, fatigue, dizziness, increased heart rate, or chest pain.      Diagnosis: Thrombocytopenia  Assessment and Plan of Treatment: Improved. Please have your labs repeated with your PCP upon discharge    Diagnosis: Hyponatremia  Assessment and Plan of Treatment: Resolved. Please have your labs repeated with your PCP upon discharge    Diagnosis: Tracheal deviation  Assessment and Plan of Treatment: Tracheal deviation.    - CXR showed leftward deviation of trachea which may be due to an enlarged thyroid gland   you will need thyroid ultrasound for further evaluation upon discharge    Diagnosis: Metabolic encephalopathy  Assessment and Plan of Treatment: Likely due to COVID infection. This improved during your hospital stay.  You have been diagnosed with the COVID-19 virus during your hospital stay. You must self quarantine to complete a 14 day time period.  Monitor for fevers, shortness of breath and cough primarily.  Monitor your temperature daily to not any changes and increases.    It has been determined that you no longer need hospitalization and can recover while remaining in self-quarantine at home. You should follow the prevention steps below until a healthcare provider or local or state health department says you can return to your normal activities.  1. You should restrict activities outside your home, except for getting medical care.  2. Do not go to work, school, or public areas.  3. Avoid using public transportation, ride-sharing, or taxis.  4. Separate yourself from other people and animals in your home.  People: As much as possible, you should stay in a specific room and away from other people in your home. Also, you should use a separate bathroom, if available.  Animals: You should restrict contact with pets and other animals while you are sick with COVID-19, just like you would around other people. Although there have not been reports of pets or other animals becoming sick with COVID-19, it is still recommended that people sick with COVID-19 limit contact with animals until more information is known about the virus.  When possible, have another member of your household care for your animals while you are sick. If you must care for your pet or be around animals while you are sick, wash your hands before and after you interact with pets and wear a facemask.  5. Call ahead before visiting your doctor.  If you have a medical appointment, call the healthcare provider and tell them that you have or may have COVID-19. This will help the healthcare provider’s office take steps to keep other people from getting infected or exposed.  6. Wear a facemask.  You should wear a facemask when you are around other people (e.g., PRINCIPAL DISCHARGE DIAGNOSIS  Diagnosis: Pneumonia  Assessment and Plan of Treatment: You completed antibiotics. Monitor for any further signs and symptoms of further infection including but not limited to fevers, rigors, chills and or difficulty breathing.        SECONDARY DISCHARGE DIAGNOSES  Diagnosis: Candidemia  Assessment and Plan of Treatment: Continue your fluconazole as prescribed. You Will eventually need a CAT scan of your abdomen/pelvis to assess for questionable gentourinary focus for fungemia   You Will also need thyroid Ultrasound as outpatient for tracheal deviation and ECHOcardiogram to assess heart function.  Lastly you will need to follow up with ophthalmology non-emergently to evaluate for ocular fungemia       Diagnosis: Metabolic encephalopathy  Assessment and Plan of Treatment: Likely due to COVID infection. This improved during your hospital stay.  You have been diagnosed with the COVID-19 virus during your hospital stay. You must self quarantine to complete a 14 day time period.  Monitor for fevers, shortness of breath and cough primarily.  Monitor your temperature daily to not any changes and increases.    It has been determined that you no longer need hospitalization and can recover while remaining in self-quarantine at home. You should follow the prevention steps below until a healthcare provider or local or state health department says you can return to your normal activities.  1. You should restrict activities outside your home, except for getting medical care.  2. Do not go to work, school, or public areas.  3. Avoid using public transportation, ride-sharing, or taxis.  4. Separate yourself from other people and animals in your home.  People: As much as possible, you should stay in a specific room and away from other people in your home. Also, you should use a separate bathroom, if available.  Animals: You should restrict contact with pets and other animals while you are sick with COVID-19, just like you would around other people. Although there have not been reports of pets or other animals becoming sick with COVID-19, it is still recommended that people sick with COVID-19 limit contact with animals until more information is known about the virus.  When possible, have another member of your household care for your animals while you are sick. If you must care for your pet or be around animals while you are sick, wash your hands before and after you interact with pets and wear a facemask.  5. Call ahead before visiting your doctor.  If you have a medical appointment, call the healthcare provider and tell them that you have or may have COVID-19. This will help the healthcare provider’s office take steps to keep other people from getting infected or exposed.  6. Wear a facemask.  You should wear a facemask when you are around other people (e.g.,    Diagnosis: Thrombocytopenia  Assessment and Plan of Treatment: Improved. Please have your labs repeated with your PCP upon discharge    Diagnosis: Hyponatremia  Assessment and Plan of Treatment: Resolved. Please have your labs repeated with your PCP upon discharge    Diagnosis: Anemia  Assessment and Plan of Treatment: Follow-up with your outpatient provider if further treatment is warranted. Monitor for signs/symptoms indicating worsening of disease, such as, easy bleeding/bruising, pale skin, fatigue, dizziness, increased heart rate, or chest pain.      Diagnosis: Tracheal deviation  Assessment and Plan of Treatment: Tracheal deviation.    - CXR showed leftward deviation of trachea which may be due to an enlarged thyroid gland   -you will need thyroid ultrasound for further evaluation upon discharge PRINCIPAL DISCHARGE DIAGNOSIS  Diagnosis: Pneumonia  Assessment and Plan of Treatment: Pt was tested + COVID 19  completed antibiotics. Monitor for any further signs and symptoms of further infection including but not limited to fevers, rigors, chills and or difficulty breathing.        SECONDARY DISCHARGE DIAGNOSES  Diagnosis: Candidemia  Assessment and Plan of Treatment: Continue Fluconazole 400 mg orally daily through 4/22/2020.  You Will eventually need a CAT scan of your abdomen/pelvis to assess for questionable gentourinary focus for fungemia   You Will also need thyroid Ultrasound as outpatient for tracheal deviation and ECHOcardiogram to assess heart function.  Lastly you will need to follow up with ophthalmology non-emergently to evaluate for ocular fungemia       Diagnosis: Metabolic encephalopathy  Assessment and Plan of Treatment: Likely due to COVID infection. This improved during your hospital stay.  You have been diagnosed with the COVID-19 virus during your hospital stay. You must self quarantine to complete a 14 day time period.  Monitor for fevers, shortness of breath and cough primarily.  Monitor your temperature daily to not any changes and increases.    It has been determined that you no longer need hospitalization and can recover while remaining in self-quarantine at home. You should follow the prevention steps below until a healthcare provider or local or state health department says you can return to your normal activities.  1. You should restrict activities outside your home, except for getting medical care.  2. Do not go to work, school, or public areas.  3. Avoid using public transportation, ride-sharing, or taxis.  4. Separate yourself from other people and animals in your home.  People: As much as possible, you should stay in a specific room and away from other people in your home. Also, you should use a separate bathroom, if available.  Animals: You should restrict contact with pets and other animals while you are sick with COVID-19, just like you would around other people. Although there have not been reports of pets or other animals becoming sick with COVID-19, it is still recommended that people sick with COVID-19 limit contact with animals until more information is known about the virus.  When possible, have another member of your household care for your animals while you are sick. If you must care for your pet or be around animals while you are sick, wash your hands before and after you interact with pets and wear a facemask.  5. Call ahead before visiting your doctor.  If you have a medical appointment, call the healthcare provider and tell them that you have or may have COVID-19. This will help the healthcare provider’s office take steps to keep other people from getting infected or exposed.  6. Wear a facemask.  You should wear a facemask when you are around other people (e.g.,    Diagnosis: Thrombocytopenia  Assessment and Plan of Treatment: Improved. Please have your labs repeated with your PCP upon discharge    Diagnosis: Hyponatremia  Assessment and Plan of Treatment: Resolved. Please have your labs repeated with your PCP upon discharge    Diagnosis: Anemia  Assessment and Plan of Treatment: Follow-up with your outpatient provider if further treatment is warranted. Monitor for signs/symptoms indicating worsening of disease, such as, easy bleeding/bruising, pale skin, fatigue, dizziness, increased heart rate, or chest pain.      Diagnosis: Tracheal deviation  Assessment and Plan of Treatment: Tracheal deviation.    - CXR showed leftward deviation of trachea which may be due to an enlarged thyroid gland   -you will need thyroid ultrasound for further evaluation upon discharge

## 2020-03-23 NOTE — DISCHARGE NOTE PROVIDER - NSDCMRMEDTOKEN_GEN_ALL_CORE_FT
[place]: fluconazole 200 mg oral tablet: 2 tab(s) orally once a day  lactobacillus acidophilus oral capsule: 1 tab(s) orally 2 times a day   Lupron: as per your doctor instructions

## 2020-03-23 NOTE — PROGRESS NOTE ADULT - SUBJECTIVE AND OBJECTIVE BOX
Patient is a 85y old  Male who presents with a chief complaint of fevers, AMS (23 Mar 2020 07:06)      SUBJECTIVE / OVERNIGHT EVENTS: Pt seen and examined earlier this am, had a fever to 101.4at 1am, pt denies any sob unable to give more histroy, on nasal oxgen sat at 98%. No other new issues reported by nsg.    MEDICATIONS  (STANDING):  heparin  Injectable 5000 Unit(s) SubCutaneous every 8 hours  sodium chloride 0.9%. 1000 milliLiter(s) (50 mL/Hr) IV Continuous <Continuous>    MEDICATIONS  (PRN):      Vital Signs Last 24 Hrs  T(C): 36.7 (23 Mar 2020 12:11), Max: 38.6 (23 Mar 2020 01:34)  T(F): 98.1 (23 Mar 2020 12:11), Max: 101.5 (23 Mar 2020 01:34)  HR: 79 (23 Mar 2020 12:11) (65 - 90)  BP: 135/76 (23 Mar 2020 12:11) (125/81 - 167/83)  BP(mean): --  RR: 17 (23 Mar 2020 12:11) (16 - 24)  SpO2: 99% (23 Mar 2020 12:11) (97% - 100%)  CAPILLARY BLOOD GLUCOSE        I&O's Summary      PHYSICAL EXAM:  GENERAL: NAD, well-developed  CHEST/LUNG: Clear to auscultation bilaterally  HEART: Regular rate and rhythm  ABDOMEN: Soft, Nontender, Nondistended  EXTREMITIES: no LE edema  PSYCH: Calm  NEUROLOGY: AA, responsive, appears comfortable  SKIN: No rashes or lesions    LABS:                        12.6   4.95  )-----------( 104      ( 22 Mar 2020 12:42 )             37.4     03-22    131<L>  |  98  |  22  ----------------------------<  112<H>  4.0   |  20<L>  |  1.17    Ca    7.9<L>      22 Mar 2020 12:42  Phos  3.5     03-22  Mg     1.9     03-22    TPro  6.4  /  Alb  3.3  /  TBili  0.3  /  DBili  x   /  AST  51<H>  /  ALT  20  /  AlkPhos  51  03-22              RADIOLOGY & ADDITIONAL TESTS:    Imaging Personally Reviewed:    Consultant(s) Notes Reviewed:      Care Discussed with Consultants/Other Providers:

## 2020-03-23 NOTE — PROGRESS NOTE ADULT - PROBLEM SELECTOR PLAN 4
- CXR shows leftward deviation of trachea which may be due to an enlarged thyroid gland  - f/u TSH, consider thyroid ultrasound vs CT chest after COVID results back

## 2020-03-23 NOTE — DISCHARGE NOTE PROVIDER - NSDCQMCOGNITION_NEU_ALL_CORE
Difficulty making decisions/Difficulty concentrating Difficulty remembering/Difficulty making decisions/Difficulty concentrating

## 2020-03-23 NOTE — PROGRESS NOTE ADULT - PROBLEM SELECTOR PLAN 1
- patient with CXR showing b/l lung opacities concerning for PNA, possibly bacterial given 7% bandemia, however could be viral 2/2 COVID-19  - s/p ceftriaxone and azithro for CAP, f/u urine legionella. Hold off on abx until blood cultures.   - if COVID-19 positive, PNA can be 2/2 to COVID-19  - currently off supplemental O2, can use nasal cannula or nonrebreather. No high-flow or BIPAP given COVID rule out if needed. Low threshold to consult MICU if in respiratory distress  - f/u labs, discussed with ACP - patient with CXR showing b/l lung opacities concerning for PNA, possibly bacterial given 7% bandemia, however could be viral 2/2 COVID-19  - s/p ceftriaxone and azithro for CAP, f/u urine legionella.  - will start back on abx until covid and blood cultures results are resulted  - Discussed with ID attending. f/u today's labs  - discussed with ACP   - if COVID-19 positive, PNA can be 2/2 to COVID-19  - currently off supplemental O2, can use nasal cannula or nonrebreather. No high-flow or BIPAP given COVID rule out if needed. Low threshold to consult MICU if in respiratory distress  - f/u labs, discussed with ACP

## 2020-03-23 NOTE — PROGRESS NOTE ADULT - PROBLEM SELECTOR PLAN 7
- patient with low platelet of 104, however no concern for bleeding at this time  - most likely in the setting of infection  - continue to trend

## 2020-03-24 LAB
APPEARANCE UR: CLEAR — SIGNIFICANT CHANGE UP
BACTERIA # UR AUTO: NEGATIVE — SIGNIFICANT CHANGE UP
BASOPHILS # BLD AUTO: 0.01 K/UL — SIGNIFICANT CHANGE UP (ref 0–0.2)
BASOPHILS NFR BLD AUTO: 0.2 % — SIGNIFICANT CHANGE UP (ref 0–2)
BILIRUB UR-MCNC: NEGATIVE — SIGNIFICANT CHANGE UP
BLOOD UR QL VISUAL: SIGNIFICANT CHANGE UP
COLOR SPEC: YELLOW — SIGNIFICANT CHANGE UP
EOSINOPHIL # BLD AUTO: 0 K/UL — SIGNIFICANT CHANGE UP (ref 0–0.5)
EOSINOPHIL NFR BLD AUTO: 0 % — SIGNIFICANT CHANGE UP (ref 0–6)
GLUCOSE UR-MCNC: NEGATIVE — SIGNIFICANT CHANGE UP
HCT VFR BLD CALC: 42.4 % — SIGNIFICANT CHANGE UP (ref 39–50)
HGB BLD-MCNC: 14.1 G/DL — SIGNIFICANT CHANGE UP (ref 13–17)
IMM GRANULOCYTES NFR BLD AUTO: 0.7 % — SIGNIFICANT CHANGE UP (ref 0–1.5)
KETONES UR-MCNC: SIGNIFICANT CHANGE UP
L PNEUMO AG UR QL: NEGATIVE — SIGNIFICANT CHANGE UP
LEUKOCYTE ESTERASE UR-ACNC: NEGATIVE — SIGNIFICANT CHANGE UP
LYMPHOCYTES # BLD AUTO: 0.5 K/UL — LOW (ref 1–3.3)
LYMPHOCYTES # BLD AUTO: 11.3 % — LOW (ref 13–44)
MCHC RBC-ENTMCNC: 26 PG — LOW (ref 27–34)
MCHC RBC-ENTMCNC: 33.3 % — SIGNIFICANT CHANGE UP (ref 32–36)
MCV RBC AUTO: 78.1 FL — LOW (ref 80–100)
MONOCYTES # BLD AUTO: 0.3 K/UL — SIGNIFICANT CHANGE UP (ref 0–0.9)
MONOCYTES NFR BLD AUTO: 6.8 % — SIGNIFICANT CHANGE UP (ref 2–14)
NEUTROPHILS # BLD AUTO: 3.57 K/UL — SIGNIFICANT CHANGE UP (ref 1.8–7.4)
NEUTROPHILS NFR BLD AUTO: 81 % — HIGH (ref 43–77)
NITRITE UR-MCNC: NEGATIVE — SIGNIFICANT CHANGE UP
NRBC # FLD: 0 K/UL — SIGNIFICANT CHANGE UP (ref 0–0)
PH UR: 6 — SIGNIFICANT CHANGE UP (ref 5–8)
PLATELET # BLD AUTO: 124 K/UL — LOW (ref 150–400)
PMV BLD: 10.7 FL — SIGNIFICANT CHANGE UP (ref 7–13)
PROT UR-MCNC: 100 — HIGH
RBC # BLD: 5.43 M/UL — SIGNIFICANT CHANGE UP (ref 4.2–5.8)
RBC # FLD: 14.9 % — HIGH (ref 10.3–14.5)
RBC CASTS # UR COMP ASSIST: HIGH (ref 0–?)
SARS-COV-2 RNA SPEC QL NAA+PROBE: DETECTED
SP GR SPEC: 1.03 — SIGNIFICANT CHANGE UP (ref 1–1.04)
SQUAMOUS # UR AUTO: SIGNIFICANT CHANGE UP
UROBILINOGEN FLD QL: NORMAL — SIGNIFICANT CHANGE UP
WBC # BLD: 4.41 K/UL — SIGNIFICANT CHANGE UP (ref 3.8–10.5)
WBC # FLD AUTO: 4.41 K/UL — SIGNIFICANT CHANGE UP (ref 3.8–10.5)
WBC UR QL: SIGNIFICANT CHANGE UP (ref 0–?)

## 2020-03-24 PROCEDURE — 99223 1ST HOSP IP/OBS HIGH 75: CPT

## 2020-03-24 PROCEDURE — 99233 SBSQ HOSP IP/OBS HIGH 50: CPT

## 2020-03-24 PROCEDURE — 93010 ELECTROCARDIOGRAM REPORT: CPT

## 2020-03-24 RX ORDER — HYDROXYCHLOROQUINE SULFATE 200 MG
TABLET ORAL
Refills: 0 | Status: DISCONTINUED | OUTPATIENT
Start: 2020-03-24 | End: 2020-03-24

## 2020-03-24 RX ORDER — FLUCONAZOLE 150 MG/1
800 TABLET ORAL ONCE
Refills: 0 | Status: COMPLETED | OUTPATIENT
Start: 2020-03-24 | End: 2020-03-24

## 2020-03-24 RX ORDER — HYDROXYCHLOROQUINE SULFATE 200 MG
400 TABLET ORAL EVERY 12 HOURS
Refills: 0 | Status: DISCONTINUED | OUTPATIENT
Start: 2020-03-24 | End: 2020-03-24

## 2020-03-24 RX ORDER — FLUCONAZOLE 150 MG/1
400 TABLET ORAL EVERY 24 HOURS
Refills: 0 | Status: DISCONTINUED | OUTPATIENT
Start: 2020-03-24 | End: 2020-03-24

## 2020-03-24 RX ORDER — HYDROXYCHLOROQUINE SULFATE 200 MG
200 TABLET ORAL EVERY 12 HOURS
Refills: 0 | Status: COMPLETED | OUTPATIENT
Start: 2020-03-25 | End: 2020-03-29

## 2020-03-24 RX ORDER — HYDROXYCHLOROQUINE SULFATE 200 MG
TABLET ORAL
Refills: 0 | Status: COMPLETED | OUTPATIENT
Start: 2020-03-24 | End: 2020-03-29

## 2020-03-24 RX ORDER — CASPOFUNGIN ACETATE 7 MG/ML
50 INJECTION, POWDER, LYOPHILIZED, FOR SOLUTION INTRAVENOUS EVERY 24 HOURS
Refills: 0 | Status: DISCONTINUED | OUTPATIENT
Start: 2020-03-25 | End: 2020-03-30

## 2020-03-24 RX ORDER — HYDROXYCHLOROQUINE SULFATE 200 MG
400 TABLET ORAL EVERY 12 HOURS
Refills: 0 | Status: COMPLETED | OUTPATIENT
Start: 2020-03-24 | End: 2020-03-25

## 2020-03-24 RX ADMIN — Medication 1 TABLET(S): at 17:38

## 2020-03-24 RX ADMIN — HEPARIN SODIUM 5000 UNIT(S): 5000 INJECTION INTRAVENOUS; SUBCUTANEOUS at 12:49

## 2020-03-24 RX ADMIN — Medication 1 TABLET(S): at 05:33

## 2020-03-24 RX ADMIN — Medication 650 MILLIGRAM(S): at 22:05

## 2020-03-24 RX ADMIN — Medication 400 MILLIGRAM(S): at 17:38

## 2020-03-24 RX ADMIN — Medication 650 MILLIGRAM(S): at 23:02

## 2020-03-24 RX ADMIN — CEFTRIAXONE 100 MILLIGRAM(S): 500 INJECTION, POWDER, FOR SOLUTION INTRAMUSCULAR; INTRAVENOUS at 05:33

## 2020-03-24 RX ADMIN — AZITHROMYCIN 500 MILLIGRAM(S): 500 TABLET, FILM COATED ORAL at 12:48

## 2020-03-24 RX ADMIN — FLUCONAZOLE 100 MILLIGRAM(S): 150 TABLET ORAL at 13:56

## 2020-03-24 RX ADMIN — HEPARIN SODIUM 5000 UNIT(S): 5000 INJECTION INTRAVENOUS; SUBCUTANEOUS at 22:05

## 2020-03-24 RX ADMIN — HEPARIN SODIUM 5000 UNIT(S): 5000 INJECTION INTRAVENOUS; SUBCUTANEOUS at 05:33

## 2020-03-24 NOTE — PROGRESS NOTE ADULT - SUBJECTIVE AND OBJECTIVE BOX
Patient is a 85y old  Male who presents with a chief complaint of fevers, AMS (24 Mar 2020 13:53)      SUBJECTIVE / OVERNIGHT EVENTS: Pt seen and examined at 12:40pm, had a fever to 101.1at 9pm, pt denies any sob or cough, on nasal oxgen sat at 96%. No other new issues reported by nsg.      MEDICATIONS  (STANDING):  azithromycin   Tablet 500 milliGRAM(s) Oral daily  cefTRIAXone   IVPB 2000 milliGRAM(s) IV Intermittent every 12 hours  fluconAZOLE IVPB 400 milliGRAM(s) IV Intermittent every 24 hours  heparin  Injectable 5000 Unit(s) SubCutaneous every 8 hours  lactobacillus acidophilus 1 Tablet(s) Oral two times a day  sodium chloride 0.9%. 1000 milliLiter(s) (50 mL/Hr) IV Continuous <Continuous>  vancomycin  IVPB      vancomycin  IVPB 1000 milliGRAM(s) IV Intermittent every 24 hours    MEDICATIONS  (PRN):  acetaminophen   Tablet .. 650 milliGRAM(s) Oral every 6 hours PRN Temp greater or equal to 38C (100.4F)      Vital Signs Last 24 Hrs  T(C): 36.5 (24 Mar 2020 11:59), Max: 38.4 (23 Mar 2020 21:52)  T(F): 97.7 (24 Mar 2020 11:59), Max: 101.1 (23 Mar 2020 21:52)  HR: 80 (24 Mar 2020 11:59) (77 - 80)  BP: 115/70 (24 Mar 2020 11:59) (115/70 - 139/89)  BP(mean): --  RR: 18 (24 Mar 2020 11:59) (16 - 18)  SpO2: 99% (24 Mar 2020 11:59) (96% - 99%)  CAPILLARY BLOOD GLUCOSE        I&O's Summary      PHYSICAL EXAM:  GENERAL: NAD, well-developed  CHEST/LUNG: Clear to auscultation bilaterally  HEART: Regular rate and rhythm  ABDOMEN: Soft, Nontender, Nondistended  EXTREMITIES: no LE edema  PSYCH: Calm  NEUROLOGY: AA, responsive, oriented to self and place, appears comfortable      LABS:                        14.1   4.41  )-----------( 124      ( 24 Mar 2020 10:45 )             42.4     03-23    135  |  98  |  19  ----------------------------<  113<H>  4.4   |  23  |  0.93    Ca    8.3<L>      23 Mar 2020 16:46  Phos  2.6     03-23  Mg     1.9     03-23                RADIOLOGY & ADDITIONAL TESTS:    Imaging Personally Reviewed:    Consultant(s) Notes Reviewed:      Care Discussed with Consultants/Other Providers:

## 2020-03-24 NOTE — CONSULT NOTE ADULT - ATTENDING COMMENTS
85 year old with prostate cancer presents with change in MS with fever    1) COVID+   supportive care  Airborne/ contact    Given his advanced age and comorbidity, he is at high risk for complication from COVID.  There is anecdotal reports of improvement with hydroxychloroquine.  I think given that his risk of mortality is probably over 20 percent, that a trial of hydroxychloroquine is appropriate.      2) Candidemia:  ?  focus    Check UA and Urine CX    Repeat blood Cx times two    Eventually will need CT a/p , echo, opthalmology- but can hold off until off isolation    Change to caspofungin (start on 3/25)  Will change to fluconazole after hydroxychloroquine course completed

## 2020-03-24 NOTE — CHART NOTE - NSCHARTNOTEFT_GEN_A_CORE
Called patient's daughter (Kayla), [phone] or [phone], and explained the need for Plaquenil and Caspofungin. All questions were answered and she was in agreement to begin treatment.    Gage Alcantar PA-C

## 2020-03-24 NOTE — PROGRESS NOTE ADULT - PROBLEM SELECTOR PLAN 3
- patient with 7% bandemia with lymphopenia concerning for infection  - +COVID-19 PCR,  -f/u rpt blood cultures from 23rd  - lymphopenia possibly 2/2 COVID however will cont on abx for now  - continue to trend

## 2020-03-24 NOTE — PHARMACOTHERAPY INTERVENTION NOTE - NSPHARMCOMMASP
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 She presents today for repeat left hip joint injection under fluoroscopy. I explained procedure the patient as well as potential risks Patient she understood this and was willing to proceed.    Procedure: Patient taken procedure room and supine position. Using fluoroscopy identified the left hip joint. After sterile prep and drape 1% lidocaine was used to anesthetize the skin. A 22-gauge quickie needle was advanced easily into the left hip joint and this was followed by 1 cc of Isovue-M showed dye spread throughout the hip joint capsule. This was then followed by 80 mg Depo-Medrol and 6 cc of 0.5% Marcaine. She overall tolerated procedure well was taken outpatient area, discharge.   ASP - Therapy recommended/ Alternative therapy Mesfin REED

## 2020-03-24 NOTE — PROGRESS NOTE ADULT - PROBLEM SELECTOR PLAN 1
- patient with CXR showing b/l lung opacities concerning for PNA, possibly bacterial given 7% bandemia, however could be viral 2/2 COVID-19 also with bacteremia now with candida   - cont ceftriaxone and azithro for CAP,   -urine legionella neg  -  blood cultures with coag neg staph and candida parapsilosis  - Will get ID consult for fungemia will need to start on antifungals  - f/u ID consult, discussed with ACP   - if COVID-19 positive, PNA can be 2/2 to COVID-19  - try to wean off of nasal cannula oxyen,   -No high-flow or BIPAP given COVID rule out if needed. Low threshold to consult MICU if in respiratory distress  - Plan discussed with ACP

## 2020-03-24 NOTE — CHART NOTE - NSCHARTNOTEFT_GEN_A_CORE
ID consulted for BCx(+) Candida and also discussed case with ID Pharmacist and will implement recommendations. Consult appreciated    Gage Alcantar PA-C

## 2020-03-24 NOTE — CONSULT NOTE ADULT - SUBJECTIVE AND OBJECTIVE BOX
Patient is a 85y old  Male who presents with a chief complaint of fevers, AMS (23 Mar 2020 15:44)    HPI:  84 y/o M PMHx of prostate ca on lupron, glaucoma, , GERD was brought from home for one day of fevers, weakness, and AMS. Pt is oriented to person and place but not situation, unsure why he is in hospital and therefore unable to provide much history, history obtained largely from chart review and from daughter Kayla on the phone ([phone]). Pt was brought in for confusion, decreased PO intake, and episode of urinary incontinence, found to be febrile at home to 104. As per daughter, on 3/21 had episode of urinary incontinence, on 3/22 was febrile and confused. Pt was spending the winter in [facility] from december to 3/4, had brief hospitalization there for syncopal episode. In the hospital, tmax 101.5, BP stable, SPO2 high 90s on 2LNC, no leukocytosis however 7.2% bands, leukopenia noted CXR with mid/upper chest opacity with tracheal deviation of unclear significance, b/l lower lung opacities R>L. RVP negative, COVID 19 testing positive, with Bcx from 3/22 growing CoNS and Candida parapsilosis in 1 of 2 Bcx bottles.    He is currently receiving vancomycin, CTX, azithromycin, and fluconazole.    Pt currently denies fever, chills, cough, SOB, diarrhea, back pain, dysuria. No mental or ports, artifical joints or valves as per daughter.     prior hospital charts reviewed [ x ]  primary team notes reviewed [ x]  other consultant notes reviewed [ x ]  PAST MEDICAL & SURGICAL HISTORY:  Prostate cancer  No significant past surgical history    Allergies  No Known Allergies    ANTIMICROBIALS (past 90 days)  MEDICATIONS  (STANDING):  azithromycin   Tablet   500 milliGRAM(s) Oral (03-22-20 @ 17:37)    azithromycin   Tablet   500 milliGRAM(s) Oral (03-24-20 @ 12:48)   500 milliGRAM(s) Oral (03-23-20 @ 18:22)    cefTRIAXone   IVPB   100 mL/Hr IV Intermittent (03-24-20 @ 05:33)   100 mL/Hr IV Intermittent (03-23-20 @ 17:44)    cefTRIAXone   IVPB   100 mL/Hr IV Intermittent (03-22-20 @ 17:37)    vancomycin  IVPB   250 mL/Hr IV Intermittent (03-23-20 @ 17:44)      ANTIMICROBIALS:    azithromycin   Tablet 500 daily  cefTRIAXone   IVPB 2000 every 12 hours  fluconAZOLE IVPB 800 once  fluconAZOLE IVPB 400 every 24 hours  vancomycin  IVPB    vancomycin  IVPB 1000 every 24 hours    OTHER MEDS: MEDICATIONS  (STANDING):  acetaminophen   Tablet .. 650 every 6 hours PRN  heparin  Injectable 5000 every 8 hours    SOCIAL HISTORY:  PT denies smoking, drug use  FAMILY HISTORY: Pt denies    REVIEW OF SYSTEMS  [  ] ROS unobtainable because:    [x  ] All other systems negative except as noted below:	    Constitutional:  [ ] fever [ ] chills  [ ] weight loss  [ ] weakness  Skin:  [ ] rash [ ] phlebitis	  Eyes: [ ] icterus [ ] pain  [ ] discharge	  ENMT: [ ] sore throat  [ ] thrush [ ] ulcers [ ] exudates  Respiratory: [ ] dyspnea [ ] hemoptysis [ ] cough [ ] sputum	  Cardiovascular:  [ ] chest pain [ ] palpitations [ ] edema	  Gastrointestinal:  [ ] nausea [ ] vomiting [ ] diarrhea [ ] constipation [ ] pain	  Genitourinary:  [ ] dysuria [ ] frequency [ ] hematuria [ ] discharge [ ] flank pain  [ ] incontinence  Musculoskeletal:  [ ] myalgias [ ] arthralgias [ ] arthritis  [ ] back pain  Neurological:  [ ] headache [ ] seizures  [ ] confusion/altered mental status  Psychiatric:  [ ] anxiety [ ] depression	  Hematology/Lymphatics:  [ ] lymphadenopathy  Endocrine:  [ ] adrenal [ ] thyroid  Allergic/Immunologic:	 [ ] transplant [ ] seasonal    Vital Signs Last 24 Hrs  T(F): 97.7 (03-24-20 @ 11:59), Max: 101.5 (03-23-20 @ 01:34)  Vital Signs Last 24 Hrs  HR: 80 (03-24-20 @ 11:59) (77 - 80)  BP: 115/70 (03-24-20 @ 11:59) (115/70 - 139/89)  RR: 18 (03-24-20 @ 11:59)  SpO2: 99% (03-24-20 @ 11:59) (96% - 99%)  Wt(kg): --    PHYSICAL EXAM:  Constitutional: non-toxic, no distress, oriented to person and place but not situation  HEAD/EYES: anicteric, no conjunctival injection  ENT:  supple, no thrush  Cardiovascular:   normal S1, S2, no murmur, no edema  Respiratory:  possible bibasilar rales  GI:  soft, non-tender, normal bowel sounds  :  no mendenhall, no CVA tenderness  Musculoskeletal:  no synovitis, normal ROM  Neurologic: awake and alert, normal strength, no focal findings  Skin:  no rash, no erythema, no phlebitis  Heme/Onc: no lymphadenopathy                               14.1   4.41  )-----------( 124      ( 24 Mar 2020 10:45 )             42.4     03-23    135  |  98  |  19  ----------------------------<  113<H>  4.4   |  23  |  0.93    Ca    8.3<L>      23 Mar 2020 16:46  Phos  2.6     03-23  Mg     1.9     03-23      MICROBIOLOGY:  Culture - Blood (collected 22 Mar 2020 20:18)  Source: .Blood Blood-Peripheral  Gram Stain (23 Mar 2020 16:04):    Growth in anaerobic bottle: Gram positive cocci in pairs  Preliminary Report (23 Mar 2020 16:04):    Growth in anaerobic bottle: Gram positive cocci in pairs    ***Blood Panel PCR results on this specimen are available    approximately 3 hours after the Gram stain result.***    Gram stain, PCR, and/or culture results may not always    correspond due to difference in methodologies.    ************************************************************    This PCR assay was performed using [employer].    The following targets are tested for: Enterococcus,    vancomycin resistant enterococci, Listeria monocytogenes,    coagulase negative staphylococci, S. aureus,    methicillin resistant S. aureus, Streptococcus agalactiae    (Group B), S. pneumoniae, S. pyogenes (Group A),    Acinetobacter baumannii, Enterobacter cloacae, E. coli,    Klebsiella oxytoca, K. pneumoniae, Proteus sp.,    Serratia marcescens, Haemophilus influenzae,    Neisseria meningitidis, Pseudomonas aeruginosa, Candida    albicans, C. glabrata, C krusei, C parapsilosis,    C. tropicalis and the KPC resistance gene.  Organism: Blood Culture PCR (23 Mar 2020 19:31)  Organism: Blood Culture PCR (23 Mar 2020 19:31)      -  Candida parapsilosis: Detec      -  Coagulase negative Staphylococcus: Detec      Method Type: PCR    COVID-19 PCR . (03.22.20 @ 19:08)    COVID-19 PCR: Detected: LDT - Laboratory Developed Test All “detected” results on this new test  are considered presumptively positive results, are clinically actionable,  and specimens will be forwarded to [facility] for confirmation testing.  Another report (corrected report) will only be issued if discordant  results occur.  This test has been validated by [employer] to be accurate;  though it has not been FDA cleared/approved by the usual pathway.  As with all laboratory tests, results should be correlated with clinical  findings.                  RADIOLOGY:  < from: Xray Chest 1 View AP/PA (03.22.20 @ 15:25) >    IMPRESSION:    Leftward deviation of the trachea with an associated opacity in the upper mid chest. Findings are nonspecific and may be due to an enlarged thyroid gland. Correlation with prior imaging be helpful. A CT of the chest may be obtained for further evaluation.    Bilateral lower lung faint peripheral opacities, right greater than left. Findings are suspicious for infection.        < end of copied text >

## 2020-03-24 NOTE — PROGRESS NOTE ADULT - PROBLEM SELECTOR PLAN 2
- patient with altered mental status compared to baseline, now improving  - most likely in the setting of PNA 2/2 COVID and now with fungemia  -cont abx  - monitor electrolytes

## 2020-03-24 NOTE — CONSULT NOTE ADULT - ASSESSMENT
84 y/o M PMHx of prostate ca on lupron, glaucoma, , GERD was brought from home for one day of fevers, weakness, and AMS, found to be COVID 19 positive with CoNS bacteremia and Candida parapsilosis fungemia.    COVID 19  -positive on 3/22  -pt currently satting well on 2LNC, would check off oxygen to see if he requires at this time.  -recommendations regarding potential treatment pending  -spoke to daughter alan, advised all sick contacts to quarantine for at least 7 days and monitor for development of symptoms    CoNS bacteremia  -likely contaminant. F/u repeat Bcx. Pt without hardware    Candida parapsilosis fungemia  -unclear source, pt without CVC, without chemotherapy  -f/u repeat cultures  -c/w fluconazole for now  -check EKG for QTc  -further recs pending

## 2020-03-24 NOTE — PHARMACOTHERAPY INTERVENTION NOTE - COMMENTS
ELISSA LOYA, 85y Male with COVID-19 (+) and 1 set BCx showing CoNS and Candida parapsilosis, currently on vanco/CTX/azithromycin.    Recommendation(s):  1) Would suggest calling ID consult for candidemia to identify source of infection, r/o seeding of infection to other areas.  2) Please draw repeat BCx Q48 -72H to monitor for clearance.  3) Would D/C vancomycin/CTX/azithromycin.  4) Suggest starting fluconazole 800 mg IV today, then 400 mg IV QD starting tomorrow. Monitor for QTc prolongation.  5) Trend creatinine, may require dose adjustment of fluconazole.  6) Last fever 101.1 on 3/23, O2 sat >95% on NC 2L, CXR consistent with COVID-19 viral infection. Patient does not meet plaquenil criteria at this time, can consider if patient becomes hypoxic or requires increasing O2.    With kind regards,  Guanakito Amor, PharmD  PGY-2 Infectious Diseases Pharmacy Resident  Spectra [phone]  .

## 2020-03-24 NOTE — PROGRESS NOTE ADULT - PROBLEM SELECTOR PLAN 4
- CXR shows leftward deviation of trachea which may be due to an enlarged thyroid gland  -  TSH nl, consider thyroid ultrasound vs CT chest when appropriate - CXR shows leftward deviation of trachea which may be due to an enlarged thyroid gland  -  f/u TSH , consider thyroid ultrasound vs CT chest when appropriate

## 2020-03-25 LAB
ALBUMIN SERPL ELPH-MCNC: 2.8 G/DL — LOW (ref 3.3–5)
ALP SERPL-CCNC: 49 U/L — SIGNIFICANT CHANGE UP (ref 40–120)
ALT FLD-CCNC: 21 U/L — SIGNIFICANT CHANGE UP (ref 4–41)
ANION GAP SERPL CALC-SCNC: 12 MMO/L — SIGNIFICANT CHANGE UP (ref 7–14)
AST SERPL-CCNC: 51 U/L — HIGH (ref 4–40)
BASOPHILS # BLD AUTO: 0.01 K/UL — SIGNIFICANT CHANGE UP (ref 0–0.2)
BASOPHILS NFR BLD AUTO: 0.3 % — SIGNIFICANT CHANGE UP (ref 0–2)
BILIRUB SERPL-MCNC: 0.3 MG/DL — SIGNIFICANT CHANGE UP (ref 0.2–1.2)
BUN SERPL-MCNC: 19 MG/DL — SIGNIFICANT CHANGE UP (ref 7–23)
CALCIUM SERPL-MCNC: 8.4 MG/DL — SIGNIFICANT CHANGE UP (ref 8.4–10.5)
CHLORIDE SERPL-SCNC: 98 MMOL/L — SIGNIFICANT CHANGE UP (ref 98–107)
CO2 SERPL-SCNC: 22 MMOL/L — SIGNIFICANT CHANGE UP (ref 22–31)
CREAT SERPL-MCNC: 0.96 MG/DL — SIGNIFICANT CHANGE UP (ref 0.5–1.3)
CULTURE RESULTS: NO GROWTH — SIGNIFICANT CHANGE UP
EOSINOPHIL # BLD AUTO: 0 K/UL — SIGNIFICANT CHANGE UP (ref 0–0.5)
EOSINOPHIL NFR BLD AUTO: 0 % — SIGNIFICANT CHANGE UP (ref 0–6)
FERRITIN SERPL-MCNC: 1175 NG/ML — HIGH (ref 30–400)
GLUCOSE SERPL-MCNC: 102 MG/DL — HIGH (ref 70–99)
HCT VFR BLD CALC: 41.3 % — SIGNIFICANT CHANGE UP (ref 39–50)
HGB BLD-MCNC: 13.8 G/DL — SIGNIFICANT CHANGE UP (ref 13–17)
IMM GRANULOCYTES NFR BLD AUTO: 0.8 % — SIGNIFICANT CHANGE UP (ref 0–1.5)
IRON SATN MFR SERPL: 192 UG/DL — SIGNIFICANT CHANGE UP (ref 155–535)
IRON SATN MFR SERPL: 39 UG/DL — LOW (ref 45–165)
LYMPHOCYTES # BLD AUTO: 0.78 K/UL — LOW (ref 1–3.3)
LYMPHOCYTES # BLD AUTO: 21.1 % — SIGNIFICANT CHANGE UP (ref 13–44)
MAGNESIUM SERPL-MCNC: 2.3 MG/DL — SIGNIFICANT CHANGE UP (ref 1.6–2.6)
MCHC RBC-ENTMCNC: 26.2 PG — LOW (ref 27–34)
MCHC RBC-ENTMCNC: 33.4 % — SIGNIFICANT CHANGE UP (ref 32–36)
MCV RBC AUTO: 78.4 FL — LOW (ref 80–100)
MONOCYTES # BLD AUTO: 0.29 K/UL — SIGNIFICANT CHANGE UP (ref 0–0.9)
MONOCYTES NFR BLD AUTO: 7.9 % — SIGNIFICANT CHANGE UP (ref 2–14)
NEUTROPHILS # BLD AUTO: 2.58 K/UL — SIGNIFICANT CHANGE UP (ref 1.8–7.4)
NEUTROPHILS NFR BLD AUTO: 69.9 % — SIGNIFICANT CHANGE UP (ref 43–77)
NRBC # FLD: 0 K/UL — SIGNIFICANT CHANGE UP (ref 0–0)
PHOSPHATE SERPL-MCNC: 2.9 MG/DL — SIGNIFICANT CHANGE UP (ref 2.5–4.5)
PLATELET # BLD AUTO: 143 K/UL — LOW (ref 150–400)
PMV BLD: 11.3 FL — SIGNIFICANT CHANGE UP (ref 7–13)
POTASSIUM SERPL-MCNC: 4.1 MMOL/L — SIGNIFICANT CHANGE UP (ref 3.5–5.3)
POTASSIUM SERPL-SCNC: 4.1 MMOL/L — SIGNIFICANT CHANGE UP (ref 3.5–5.3)
PROT SERPL-MCNC: 6.4 G/DL — SIGNIFICANT CHANGE UP (ref 6–8.3)
RBC # BLD: 5.27 M/UL — SIGNIFICANT CHANGE UP (ref 4.2–5.8)
RBC # FLD: 14.8 % — HIGH (ref 10.3–14.5)
SODIUM SERPL-SCNC: 132 MMOL/L — LOW (ref 135–145)
SPECIMEN SOURCE: SIGNIFICANT CHANGE UP
T4 FREE SERPL-MCNC: 1.55 NG/DL — SIGNIFICANT CHANGE UP (ref 0.9–1.8)
TSH SERPL-MCNC: 2.47 UIU/ML — SIGNIFICANT CHANGE UP (ref 0.27–4.2)
UIBC SERPL-MCNC: 153.1 UG/DL — SIGNIFICANT CHANGE UP (ref 110–370)
WBC # BLD: 3.69 K/UL — LOW (ref 3.8–10.5)
WBC # FLD AUTO: 3.69 K/UL — LOW (ref 3.8–10.5)

## 2020-03-25 PROCEDURE — 99233 SBSQ HOSP IP/OBS HIGH 50: CPT

## 2020-03-25 PROCEDURE — 93010 ELECTROCARDIOGRAM REPORT: CPT

## 2020-03-25 RX ADMIN — Medication 400 MILLIGRAM(S): at 05:51

## 2020-03-25 RX ADMIN — Medication 1 TABLET(S): at 05:51

## 2020-03-25 RX ADMIN — HEPARIN SODIUM 5000 UNIT(S): 5000 INJECTION INTRAVENOUS; SUBCUTANEOUS at 13:33

## 2020-03-25 RX ADMIN — Medication 200 MILLIGRAM(S): at 17:23

## 2020-03-25 RX ADMIN — Medication 1 TABLET(S): at 17:23

## 2020-03-25 RX ADMIN — HEPARIN SODIUM 5000 UNIT(S): 5000 INJECTION INTRAVENOUS; SUBCUTANEOUS at 05:51

## 2020-03-25 RX ADMIN — CASPOFUNGIN ACETATE 260 MILLIGRAM(S): 7 INJECTION, POWDER, LYOPHILIZED, FOR SOLUTION INTRAVENOUS at 13:33

## 2020-03-25 RX ADMIN — HEPARIN SODIUM 5000 UNIT(S): 5000 INJECTION INTRAVENOUS; SUBCUTANEOUS at 21:12

## 2020-03-25 NOTE — PROGRESS NOTE ADULT - SUBJECTIVE AND OBJECTIVE BOX
Follow Up:      Inverval History/ROS:Patient is a 85y old  Male who presents with a chief complaint of fevers, AMS (25 Mar 2020 14:51)    Breathing on room air.      Allergies    No Known Allergies    Intolerances        ANTIMICROBIALS:  caspofungin IVPB 50 every 24 hours  hydroxychloroquine    hydroxychloroquine 200 every 12 hours      OTHER MEDS:  acetaminophen   Tablet .. 650 milliGRAM(s) Oral every 6 hours PRN  heparin  Injectable 5000 Unit(s) SubCutaneous every 8 hours  lactobacillus acidophilus 1 Tablet(s) Oral two times a day  sodium chloride 0.9%. 1000 milliLiter(s) IV Continuous <Continuous>      Vital Signs Last 24 Hrs  T(C): 36.7 (25 Mar 2020 12:07), Max: 38.1 (24 Mar 2020 21:57)  T(F): 98.1 (25 Mar 2020 12:07), Max: 100.5 (24 Mar 2020 21:57)  HR: 79 (25 Mar 2020 12:07) (73 - 79)  BP: 136/77 (25 Mar 2020 12:07) (127/74 - 136/77)  BP(mean): --  RR: 18 (25 Mar 2020 12:07) (16 - 18)  SpO2: 99% (25 Mar 2020 12:07) (97% - 99%)    PHYSICAL EXAM:  General: [ x] non-toxic  HEAD/EYES: [ ] PERRL [ ] white sclera [ ] icterus  ENT:  [ ] normal [ x] supple [ ] thrush [ ] pharyngeal exudate  Cardiovascular:   [ ] murmur [ ] normal [ ] PPM/AICD  Respiratory:  [x ] clear to ausculation bilaterally  GI:  [ ] soft, non-tender, normal bowel sounds  :  [ ] mendenhall [ ] no CVA tenderness   Musculoskeletal:  [x ] no synovitis  Neurologic:  [ ] non-focal exam   Skin:  [ ] no rash  Lymph: [ ] no lymphadenopathy  Psychiatric:  x[ ] appropriate affect [ ] alert & oriented  Lines:  [x ] no phlebitis [ ] central line                                13.8   3.69  )-----------( 143      ( 25 Mar 2020 04:50 )             41.3       03-25    132<L>  |  98  |  19  ----------------------------<  102<H>  4.1   |  22  |  0.96    Ca    8.4      25 Mar 2020 04:50  Phos  2.9       Mg     2.3         TPro  6.4  /  Alb  2.8<L>  /  TBili  0.3  /  DBili  x   /  AST  51<H>  /  ALT  21  /  AlkPhos  49        Urinalysis Basic - ( 24 Mar 2020 18:22 )    Color: YELLOW / Appearance: CLEAR / S.032 / pH: 6.0  Gluc: NEGATIVE / Ketone: SMALL  / Bili: NEGATIVE / Urobili: NORMAL   Blood: SMALL / Protein: 100 / Nitrite: NEGATIVE   Leuk Esterase: NEGATIVE / RBC: 6-10 / WBC 3-5   Sq Epi: OCC / Non Sq Epi: x / Bacteria: NEGATIVE        MICROBIOLOGY:Culture Results:   No growth to date. (20 @ 21:21)  Culture Results:   No growth to date. (20 @ 21:07)  Culture Results:   Growth in anaerobic bottle: Coag Negative Staphylococcus  Single set isolate, possible contaminant. Contact  Microbiology if susceptibility testing clinically  indicated.  ***Blood Panel PCR results on this specimen are available  approximately 3 hours after the Gram stain result.***  Gram stain, PCR, and/or culture results may not always  correspond due to difference in methodologies.  ************************************************************  This PCR assay was performed using [employer].  The following targets are tested for: Enterococcus,  vancomycin resistant enterococci, Listeria monocytogenes,  coagulase negative staphylococci, S. aureus,  methicillin resistant S. aureus, Streptococcus agalactiae  (Group B), S. pneumoniae, S. pyogenes (Group A),  Acinetobacter baumannii, Enterobacter cloacae, E. coli,  Klebsiella oxytoca, K. pneumoniae, Proteus sp.,  Serratia marcescens, Haemophilus influenzae,  Neisseria meningitidis, Pseudomonas aeruginosa, Candida  albicans, C. glabrata, C krusei, C parapsilosis,  C. tropicalis and the KPC resistance gene. (20 @ 20:18)      RADIOLOGY:

## 2020-03-25 NOTE — PROGRESS NOTE ADULT - SUBJECTIVE AND OBJECTIVE BOX
Patient is a 85y old  Male who presents with a chief complaint of fevers, AMS (24 Mar 2020 15:10)      SUBJECTIVE / OVERNIGHT EVENTS: Pt seen and examined at 12:40pm, had a fever to 101.1at 9pm, pt denies any sob or cough, on nasal oxgen sat at 96%. No other new issues reported by nsg.      MEDICATIONS  (STANDING):  caspofungin IVPB 50 milliGRAM(s) IV Intermittent every 24 hours  heparin  Injectable 5000 Unit(s) SubCutaneous every 8 hours  hydroxychloroquine   Oral   hydroxychloroquine 200 milliGRAM(s) Oral every 12 hours  lactobacillus acidophilus 1 Tablet(s) Oral two times a day  sodium chloride 0.9%. 1000 milliLiter(s) (50 mL/Hr) IV Continuous <Continuous>    MEDICATIONS  (PRN):  acetaminophen   Tablet .. 650 milliGRAM(s) Oral every 6 hours PRN Temp greater or equal to 38C (100.4F)      Vital Signs Last 24 Hrs  T(C): 36.7 (25 Mar 2020 12:07), Max: 38.1 (24 Mar 2020 21:57)  T(F): 98.1 (25 Mar 2020 12:07), Max: 100.5 (24 Mar 2020 21:57)  HR: 79 (25 Mar 2020 12:07) (73 - 79)  BP: 136/77 (25 Mar 2020 12:07) (127/74 - 136/77)  BP(mean): --  RR: 18 (25 Mar 2020 12:07) (16 - 18)  SpO2: 99% (25 Mar 2020 12:07) (97% - 99%)  CAPILLARY BLOOD GLUCOSE        I&O's Summary      PHYSICAL EXAM:  GENERAL: NAD, well-developed  CHEST/LUNG: Clear to auscultation bilaterally  HEART: Regular rate and rhythm  ABDOMEN: Soft, Nontender, Nondistended  EXTREMITIES: no LE edema  PSYCH: Calm  NEUROLOGY: AA, responsive, oriented to self and place, appears comfortable      LABS:                        13.8   3.69  )-----------( 143      ( 25 Mar 2020 04:50 )             41.3     03-25    132<L>  |  98  |  19  ----------------------------<  102<H>  4.1   |  22  |  0.96    Ca    8.4      25 Mar 2020 04:50  Phos  2.9     -  Mg     2.3     -    TPro  6.4  /  Alb  2.8<L>  /  TBili  0.3  /  DBili  x   /  AST  51<H>  /  ALT  21  /  AlkPhos  49            Urinalysis Basic - ( 24 Mar 2020 18:22 )    Color: YELLOW / Appearance: CLEAR / S.032 / pH: 6.0  Gluc: NEGATIVE / Ketone: SMALL  / Bili: NEGATIVE / Urobili: NORMAL   Blood: SMALL / Protein: 100 / Nitrite: NEGATIVE   Leuk Esterase: NEGATIVE / RBC: 6-10 / WBC 3-5   Sq Epi: OCC / Non Sq Epi: x / Bacteria: NEGATIVE        RADIOLOGY & ADDITIONAL TESTS:    Imaging Personally Reviewed:    Consultant(s) Notes Reviewed:      Care Discussed with Consultants/Other Providers: Patient is a 85y old  Male who presents with a chief complaint of fevers, AMS (24 Mar 2020 15:10)      SUBJECTIVE / OVERNIGHT EVENTS: Pt seen and examined earlier today, had a fever to 100.5 last night at 9pm, pt denies any sob, cough better per pt,  on nasal oxgen sat at 96%. No other new issues reported by nsg.      MEDICATIONS  (STANDING):  caspofungin IVPB 50 milliGRAM(s) IV Intermittent every 24 hours  heparin  Injectable 5000 Unit(s) SubCutaneous every 8 hours  hydroxychloroquine   Oral   hydroxychloroquine 200 milliGRAM(s) Oral every 12 hours  lactobacillus acidophilus 1 Tablet(s) Oral two times a day  sodium chloride 0.9%. 1000 milliLiter(s) (50 mL/Hr) IV Continuous <Continuous>    MEDICATIONS  (PRN):  acetaminophen   Tablet .. 650 milliGRAM(s) Oral every 6 hours PRN Temp greater or equal to 38C (100.4F)      Vital Signs Last 24 Hrs  T(C): 36.7 (25 Mar 2020 12:07), Max: 38.1 (24 Mar 2020 21:57)  T(F): 98.1 (25 Mar 2020 12:07), Max: 100.5 (24 Mar 2020 21:57)  HR: 79 (25 Mar 2020 12:07) (73 - 79)  BP: 136/77 (25 Mar 2020 12:07) (127/74 - 136/77)  BP(mean): --  RR: 18 (25 Mar 2020 12:07) (16 - 18)  SpO2: 99% (25 Mar 2020 12:07) (97% - 99%)  CAPILLARY BLOOD GLUCOSE        I&O's Summary      PHYSICAL EXAM:  GENERAL: NAD, well-developed  CHEST/LUNG: Clear to auscultation bilaterally  HEART: Regular rate and rhythm  ABDOMEN: Soft, Nontender, Nondistended  EXTREMITIES: no LE edema  PSYCH: Calm  NEUROLOGY: AA, responsive, oriented to self and place, appears comfortable      LABS:                        13.8   3.69  )-----------( 143      ( 25 Mar 2020 04:50 )             41.3     03-25    132<L>  |  98  |  19  ----------------------------<  102<H>  4.1   |  22  |  0.96    Ca    8.4      25 Mar 2020 04:50  Phos  2.9     -  Mg     2.3     -    TPro  6.4  /  Alb  2.8<L>  /  TBili  0.3  /  DBili  x   /  AST  51<H>  /  ALT  21  /  AlkPhos  49            Urinalysis Basic - ( 24 Mar 2020 18:22 )    Color: YELLOW / Appearance: CLEAR / S.032 / pH: 6.0  Gluc: NEGATIVE / Ketone: SMALL  / Bili: NEGATIVE / Urobili: NORMAL   Blood: SMALL / Protein: 100 / Nitrite: NEGATIVE   Leuk Esterase: NEGATIVE / RBC: 6-10 / WBC 3-5   Sq Epi: OCC / Non Sq Epi: x / Bacteria: NEGATIVE        RADIOLOGY & ADDITIONAL TESTS:    Imaging Personally Reviewed:    Consultant(s) Notes Reviewed:      Care Discussed with Consultants/Other Providers: Patient is a 85y old  Male who presents with a chief complaint of fevers, AMS (24 Mar 2020 15:10)      SUBJECTIVE / OVERNIGHT EVENTS: Pt seen and examined earlier today, had a fever to 100.5 last night at 9pm, pt denies any sob, cough better per pt,  on nasal 2 lit oxgen  sat at 98%. No other new issues reported by nsg.      MEDICATIONS  (STANDING):  caspofungin IVPB 50 milliGRAM(s) IV Intermittent every 24 hours  heparin  Injectable 5000 Unit(s) SubCutaneous every 8 hours  hydroxychloroquine   Oral   hydroxychloroquine 200 milliGRAM(s) Oral every 12 hours  lactobacillus acidophilus 1 Tablet(s) Oral two times a day  sodium chloride 0.9%. 1000 milliLiter(s) (50 mL/Hr) IV Continuous <Continuous>    MEDICATIONS  (PRN):  acetaminophen   Tablet .. 650 milliGRAM(s) Oral every 6 hours PRN Temp greater or equal to 38C (100.4F)      Vital Signs Last 24 Hrs  T(C): 36.7 (25 Mar 2020 12:07), Max: 38.1 (24 Mar 2020 21:57)  T(F): 98.1 (25 Mar 2020 12:07), Max: 100.5 (24 Mar 2020 21:57)  HR: 79 (25 Mar 2020 12:07) (73 - 79)  BP: 136/77 (25 Mar 2020 12:07) (127/74 - 136/77)  BP(mean): --  RR: 18 (25 Mar 2020 12:07) (16 - 18)  SpO2: 99% (25 Mar 2020 12:07) (97% - 99%)  CAPILLARY BLOOD GLUCOSE        I&O's Summary      PHYSICAL EXAM:  GENERAL: NAD, thinly built male  CHEST/LUNG: Clear to auscultation bilaterally  HEART: Regular rate and rhythm  ABDOMEN: Soft, Nontender, Nondistended  EXTREMITIES: no LE edema  PSYCH: Calm  NEUROLOGY: AA, responsive      LABS:                        13.8   3.69  )-----------( 143      ( 25 Mar 2020 04:50 )             41.3     03-25    132<L>  |  98  |  19  ----------------------------<  102<H>  4.1   |  22  |  0.96    Ca    8.4      25 Mar 2020 04:50  Phos  2.9       Mg     2.3         TPro  6.4  /  Alb  2.8<L>  /  TBili  0.3  /  DBili  x   /  AST  51<H>  /  ALT  21  /  AlkPhos  49            Urinalysis Basic - ( 24 Mar 2020 18:22 )    Color: YELLOW / Appearance: CLEAR / S.032 / pH: 6.0  Gluc: NEGATIVE / Ketone: SMALL  / Bili: NEGATIVE / Urobili: NORMAL   Blood: SMALL / Protein: 100 / Nitrite: NEGATIVE   Leuk Esterase: NEGATIVE / RBC: 6-10 / WBC 3-5   Sq Epi: OCC / Non Sq Epi: x / Bacteria: NEGATIVE        RADIOLOGY & ADDITIONAL TESTS:    Imaging Personally Reviewed:    Consultant(s) Notes Reviewed:      Care Discussed with Consultants/Other Providers:

## 2020-03-25 NOTE — PROGRESS NOTE ADULT - PROBLEM SELECTOR PLAN 4
- CXR shows leftward deviation of trachea which may be due to an enlarged thyroid gland  -  TSH and free t4 nl , consider thyroid ultrasound vs CT chest when appropriate

## 2020-03-25 NOTE — PROVIDER CONTACT NOTE (OTHER) - ASSESSMENT
Pt remained calm and alert during trialing on room air. Pt denies SOB/lightheadedness/dizziness. Pt desaturated to 89% Oxygen. Reinforced 2LPM via nasal cannula; oxygen levels increased back to 99%. No s/s of distress for patient.

## 2020-03-25 NOTE — PROGRESS NOTE ADULT - PROBLEM SELECTOR PLAN 2
- patient with altered mental status compared to baseline, now improving  - most likely in the setting of PNA 2/2 COVID and now with fungemia  -cont current meds  - monitor electrolytes

## 2020-03-25 NOTE — PROVIDER CONTACT NOTE (OTHER) - BACKGROUND
85 Y.O M admitted for pneumonia; pos + for COVID-19. PMHx of prostate CA, bandemia, tracheal deviation.

## 2020-03-25 NOTE — PROGRESS NOTE ADULT - PROBLEM SELECTOR PLAN 1
- patient with CXR showing b/l lung opacities concerning for PNA, possibly bacterial given 7% bandemia, however could be viral 2/2 COVID-19 also with bacteremia now with candida   - abx was d/c on 3/24 per ID recs  - on caspofungin for candidemia  - cont plaquenil  -urine legionella neg  -  blood cultures with coag neg staph likely contamination  -blood culture also with candida parapsilosis  - rpt blood culture from 23rd neg to date  - blood culture from 25th pending  - Per ID ct a/p, echo and opthal eval when feasible due to covid status  - f/u ID recs, discussed with ACP   - try to wean off of nasal cannula oxyen,   -No high-flow or BIPAP given COVID rule out if needed. Low threshold to consult MICU if in respiratory distress  - Plan discussed with ACP  - Updated pt's daughter Kayla on 3/25

## 2020-03-25 NOTE — PROGRESS NOTE ADULT - PROBLEM SELECTOR PLAN 3
- patient with 7% bandemia with lymphopenia concerning for infection  - +COVID-19 PCR,  -f/u rpt blood cultures from 23rd- neg to date  -blood culture from 25the pending  - lymphopenia possibly 2/2 COVID   - continue to trend

## 2020-03-25 NOTE — PROGRESS NOTE ADULT - PROBLEM SELECTOR PLAN 7
- likely in the setting of infection, however no concern for bleeding at this time  - most likely in the setting of infection  - continue to trend

## 2020-03-26 LAB
ALBUMIN SERPL ELPH-MCNC: 3.1 G/DL — LOW (ref 3.3–5)
ALP SERPL-CCNC: 56 U/L — SIGNIFICANT CHANGE UP (ref 40–120)
ALT FLD-CCNC: 25 U/L — SIGNIFICANT CHANGE UP (ref 4–41)
ANION GAP SERPL CALC-SCNC: 13 MMO/L — SIGNIFICANT CHANGE UP (ref 7–14)
AST SERPL-CCNC: 53 U/L — HIGH (ref 4–40)
BASOPHILS # BLD AUTO: 0.02 K/UL — SIGNIFICANT CHANGE UP (ref 0–0.2)
BASOPHILS NFR BLD AUTO: 0.5 % — SIGNIFICANT CHANGE UP (ref 0–2)
BILIRUB SERPL-MCNC: 0.4 MG/DL — SIGNIFICANT CHANGE UP (ref 0.2–1.2)
BUN SERPL-MCNC: 19 MG/DL — SIGNIFICANT CHANGE UP (ref 7–23)
CALCIUM SERPL-MCNC: 8.7 MG/DL — SIGNIFICANT CHANGE UP (ref 8.4–10.5)
CHLORIDE SERPL-SCNC: 102 MMOL/L — SIGNIFICANT CHANGE UP (ref 98–107)
CO2 SERPL-SCNC: 23 MMOL/L — SIGNIFICANT CHANGE UP (ref 22–31)
CREAT SERPL-MCNC: 0.92 MG/DL — SIGNIFICANT CHANGE UP (ref 0.5–1.3)
EOSINOPHIL # BLD AUTO: 0 K/UL — SIGNIFICANT CHANGE UP (ref 0–0.5)
EOSINOPHIL NFR BLD AUTO: 0 % — SIGNIFICANT CHANGE UP (ref 0–6)
GLUCOSE SERPL-MCNC: 91 MG/DL — SIGNIFICANT CHANGE UP (ref 70–99)
HCT VFR BLD CALC: 43 % — SIGNIFICANT CHANGE UP (ref 39–50)
HGB BLD-MCNC: 14.1 G/DL — SIGNIFICANT CHANGE UP (ref 13–17)
IMM GRANULOCYTES NFR BLD AUTO: 0.9 % — SIGNIFICANT CHANGE UP (ref 0–1.5)
LYMPHOCYTES # BLD AUTO: 0.52 K/UL — LOW (ref 1–3.3)
LYMPHOCYTES # BLD AUTO: 12.2 % — LOW (ref 13–44)
MAGNESIUM SERPL-MCNC: 2.3 MG/DL — SIGNIFICANT CHANGE UP (ref 1.6–2.6)
MCHC RBC-ENTMCNC: 25.8 PG — LOW (ref 27–34)
MCHC RBC-ENTMCNC: 32.8 % — SIGNIFICANT CHANGE UP (ref 32–36)
MCV RBC AUTO: 78.8 FL — LOW (ref 80–100)
MONOCYTES # BLD AUTO: 0.38 K/UL — SIGNIFICANT CHANGE UP (ref 0–0.9)
MONOCYTES NFR BLD AUTO: 8.9 % — SIGNIFICANT CHANGE UP (ref 2–14)
NEUTROPHILS # BLD AUTO: 3.31 K/UL — SIGNIFICANT CHANGE UP (ref 1.8–7.4)
NEUTROPHILS NFR BLD AUTO: 77.5 % — HIGH (ref 43–77)
NRBC # FLD: 0 K/UL — SIGNIFICANT CHANGE UP (ref 0–0)
PHOSPHATE SERPL-MCNC: 2.8 MG/DL — SIGNIFICANT CHANGE UP (ref 2.5–4.5)
PLATELET # BLD AUTO: 168 K/UL — SIGNIFICANT CHANGE UP (ref 150–400)
PMV BLD: 10 FL — SIGNIFICANT CHANGE UP (ref 7–13)
POTASSIUM SERPL-MCNC: 4.2 MMOL/L — SIGNIFICANT CHANGE UP (ref 3.5–5.3)
POTASSIUM SERPL-SCNC: 4.2 MMOL/L — SIGNIFICANT CHANGE UP (ref 3.5–5.3)
PROT SERPL-MCNC: 6.7 G/DL — SIGNIFICANT CHANGE UP (ref 6–8.3)
RBC # BLD: 5.46 M/UL — SIGNIFICANT CHANGE UP (ref 4.2–5.8)
RBC # FLD: 14.8 % — HIGH (ref 10.3–14.5)
SODIUM SERPL-SCNC: 138 MMOL/L — SIGNIFICANT CHANGE UP (ref 135–145)
WBC # BLD: 4.27 K/UL — SIGNIFICANT CHANGE UP (ref 3.8–10.5)
WBC # FLD AUTO: 4.27 K/UL — SIGNIFICANT CHANGE UP (ref 3.8–10.5)

## 2020-03-26 PROCEDURE — 99233 SBSQ HOSP IP/OBS HIGH 50: CPT

## 2020-03-26 RX ADMIN — CASPOFUNGIN ACETATE 260 MILLIGRAM(S): 7 INJECTION, POWDER, LYOPHILIZED, FOR SOLUTION INTRAVENOUS at 12:20

## 2020-03-26 RX ADMIN — Medication 200 MILLIGRAM(S): at 17:14

## 2020-03-26 RX ADMIN — Medication 1 TABLET(S): at 17:13

## 2020-03-26 RX ADMIN — HEPARIN SODIUM 5000 UNIT(S): 5000 INJECTION INTRAVENOUS; SUBCUTANEOUS at 05:00

## 2020-03-26 RX ADMIN — HEPARIN SODIUM 5000 UNIT(S): 5000 INJECTION INTRAVENOUS; SUBCUTANEOUS at 12:20

## 2020-03-26 RX ADMIN — Medication 1 TABLET(S): at 05:00

## 2020-03-26 RX ADMIN — Medication 200 MILLIGRAM(S): at 05:01

## 2020-03-26 RX ADMIN — HEPARIN SODIUM 5000 UNIT(S): 5000 INJECTION INTRAVENOUS; SUBCUTANEOUS at 22:39

## 2020-03-26 NOTE — PROGRESS NOTE ADULT - PROBLEM SELECTOR PLAN 3
- patient with 7% bandemia with lymphopenia concerning for infection  - +COVID-19 PCR,  -f/u rpt blood cultures from 23rd- neg to date  -blood culture from 25th neg to date  - lymphopenia possibly 2/2 COVID   - continue to trend

## 2020-03-26 NOTE — PROGRESS NOTE ADULT - SUBJECTIVE AND OBJECTIVE BOX
Patient is a 84y old  Male who presents with a chief complaint of fevers, AMS (25 Mar 2020 16:37)      SUBJECTIVE / OVERNIGHT EVENTS: Pt seen and examined at 11:25am, no overnight events, pt denies any sob, cough better per pt,  on nasal 2 lit oxgen  sat at 95%. No other new issues reported by nsg.        MEDICATIONS  (STANDING):  caspofungin IVPB 50 milliGRAM(s) IV Intermittent every 24 hours  heparin  Injectable 5000 Unit(s) SubCutaneous every 8 hours  hydroxychloroquine   Oral   hydroxychloroquine 200 milliGRAM(s) Oral every 12 hours  lactobacillus acidophilus 1 Tablet(s) Oral two times a day  sodium chloride 0.9%. 1000 milliLiter(s) (50 mL/Hr) IV Continuous <Continuous>    MEDICATIONS  (PRN):  acetaminophen   Tablet .. 650 milliGRAM(s) Oral every 6 hours PRN Temp greater or equal to 38C (100.4F)      Vital Signs Last 24 Hrs  T(C): 36.4 (26 Mar 2020 11:48), Max: 36.6 (25 Mar 2020 21:10)  T(F): 97.6 (26 Mar 2020 11:48), Max: 97.9 (25 Mar 2020 21:10)  HR: 81 (26 Mar 2020 11:48) (81 - 95)  BP: 122/85 (26 Mar 2020 11:48) (122/85 - 140/78)  BP(mean): --  RR: 17 (26 Mar 2020 11:48) (17 - 18)  SpO2: 99% (26 Mar 2020 11:48) (89% - 99%)  CAPILLARY BLOOD GLUCOSE        I&O's Summary      PHYSICAL EXAM:  GENERAL: NAD, thinly built male  CHEST/LUNG: Clear to auscultation bilaterally  HEART: Regular rate and rhythm  ABDOMEN: Soft, Nontender, Nondistended  EXTREMITIES: no LE edema  PSYCH: Calm  NEUROLOGY: AA, oriented to self and place      LABS:                        14.1   4.27  )-----------( 168      ( 26 Mar 2020 07:00 )             43.0         138  |  102  |  19  ----------------------------<  91  4.2   |  23  |  0.92    Ca    8.7      26 Mar 2020 07:00  Phos  2.8       Mg     2.3         TPro  6.7  /  Alb  3.1<L>  /  TBili  0.4  /  DBili  x   /  AST  53<H>  /  ALT  25  /  AlkPhos  56            Urinalysis Basic - ( 24 Mar 2020 18:22 )    Color: YELLOW / Appearance: CLEAR / S.032 / pH: 6.0  Gluc: NEGATIVE / Ketone: SMALL  / Bili: NEGATIVE / Urobili: NORMAL   Blood: SMALL / Protein: 100 / Nitrite: NEGATIVE   Leuk Esterase: NEGATIVE / RBC: 6-10 / WBC 3-5   Sq Epi: OCC / Non Sq Epi: x / Bacteria: NEGATIVE        RADIOLOGY & ADDITIONAL TESTS:    Imaging Personally Reviewed:    Consultant(s) Notes Reviewed:      Care Discussed with Consultants/Other Providers:

## 2020-03-26 NOTE — PROGRESS NOTE ADULT - PROBLEM SELECTOR PLAN 10
Transitions of Care Status:  1.  Name of PCP:  2.  PCP Contacted on Admission: [ ] Y    [ ] N    3.  PCP contacted at Discharge: [ ] Y    [ ] N    [ ] N/A  4.  Post-Discharge Appointment Date and Location:  5.  Summary of Handoff given to PCP:

## 2020-03-26 NOTE — PROGRESS NOTE ADULT - PROBLEM SELECTOR PLAN 1
- patient with CXR showing b/l lung opacities concerning for PNA, possibly bacterial given 7% bandemia, however could be viral 2/2 COVID-19 also with bacteremia but likely contaminant ( coag neg staph) now with candida   - abx was d/c on 3/24 per ID recs  - on caspofungin for candidemia  - cont plaquenil  -urine legionella neg  -  blood cultures with coag neg staph likely contamination  -blood culture also with candida parapsilosis  - rpt blood culture from 23rd neg to date  - blood culture from 25th neg to date  - Urine culture neg  - Per ID ct a/p, echo and opthal eval when feasible due to covid status  - f/u ID recs, discussed with ACP   - try to wean off of nasal cannula oxyen,   -No high-flow or BIPAP given COVID rule out if needed. Low threshold to consult MICU if in respiratory distress  - Plan discussed with ACP  - Updated pt's daughter Kayla on 3/25

## 2020-03-27 LAB
BASOPHILS # BLD AUTO: 0.01 K/UL — SIGNIFICANT CHANGE UP (ref 0–0.2)
BASOPHILS NFR BLD AUTO: 0.3 % — SIGNIFICANT CHANGE UP (ref 0–2)
CULTURE RESULTS: SIGNIFICANT CHANGE UP
EOSINOPHIL # BLD AUTO: 0.01 K/UL — SIGNIFICANT CHANGE UP (ref 0–0.5)
EOSINOPHIL NFR BLD AUTO: 0.3 % — SIGNIFICANT CHANGE UP (ref 0–6)
HCT VFR BLD CALC: 38.2 % — LOW (ref 39–50)
HGB BLD-MCNC: 12.8 G/DL — LOW (ref 13–17)
IMM GRANULOCYTES NFR BLD AUTO: 1.8 % — HIGH (ref 0–1.5)
LYMPHOCYTES # BLD AUTO: 0.55 K/UL — LOW (ref 1–3.3)
LYMPHOCYTES # BLD AUTO: 14 % — SIGNIFICANT CHANGE UP (ref 13–44)
MCHC RBC-ENTMCNC: 26.3 PG — LOW (ref 27–34)
MCHC RBC-ENTMCNC: 33.5 % — SIGNIFICANT CHANGE UP (ref 32–36)
MCV RBC AUTO: 78.4 FL — LOW (ref 80–100)
MONOCYTES # BLD AUTO: 0.39 K/UL — SIGNIFICANT CHANGE UP (ref 0–0.9)
MONOCYTES NFR BLD AUTO: 9.9 % — SIGNIFICANT CHANGE UP (ref 2–14)
NEUTROPHILS # BLD AUTO: 2.91 K/UL — SIGNIFICANT CHANGE UP (ref 1.8–7.4)
NEUTROPHILS NFR BLD AUTO: 73.7 % — SIGNIFICANT CHANGE UP (ref 43–77)
NRBC # FLD: 0 K/UL — SIGNIFICANT CHANGE UP (ref 0–0)
ORGANISM # SPEC MICROSCOPIC CNT: SIGNIFICANT CHANGE UP
ORGANISM # SPEC MICROSCOPIC CNT: SIGNIFICANT CHANGE UP
PLATELET # BLD AUTO: 211 K/UL — SIGNIFICANT CHANGE UP (ref 150–400)
PMV BLD: 10.3 FL — SIGNIFICANT CHANGE UP (ref 7–13)
RBC # BLD: 4.87 M/UL — SIGNIFICANT CHANGE UP (ref 4.2–5.8)
RBC # FLD: 14.8 % — HIGH (ref 10.3–14.5)
SPECIMEN SOURCE: SIGNIFICANT CHANGE UP
WBC # BLD: 3.94 K/UL — SIGNIFICANT CHANGE UP (ref 3.8–10.5)
WBC # FLD AUTO: 3.94 K/UL — SIGNIFICANT CHANGE UP (ref 3.8–10.5)

## 2020-03-27 PROCEDURE — 99232 SBSQ HOSP IP/OBS MODERATE 35: CPT

## 2020-03-27 PROCEDURE — 99233 SBSQ HOSP IP/OBS HIGH 50: CPT

## 2020-03-27 RX ADMIN — CASPOFUNGIN ACETATE 260 MILLIGRAM(S): 7 INJECTION, POWDER, LYOPHILIZED, FOR SOLUTION INTRAVENOUS at 14:23

## 2020-03-27 RX ADMIN — Medication 200 MILLIGRAM(S): at 06:29

## 2020-03-27 RX ADMIN — Medication 1 TABLET(S): at 06:29

## 2020-03-27 RX ADMIN — HEPARIN SODIUM 5000 UNIT(S): 5000 INJECTION INTRAVENOUS; SUBCUTANEOUS at 06:30

## 2020-03-27 RX ADMIN — HEPARIN SODIUM 5000 UNIT(S): 5000 INJECTION INTRAVENOUS; SUBCUTANEOUS at 21:18

## 2020-03-27 RX ADMIN — Medication 200 MILLIGRAM(S): at 17:44

## 2020-03-27 RX ADMIN — HEPARIN SODIUM 5000 UNIT(S): 5000 INJECTION INTRAVENOUS; SUBCUTANEOUS at 13:09

## 2020-03-27 RX ADMIN — Medication 1 TABLET(S): at 17:44

## 2020-03-27 NOTE — PROGRESS NOTE ADULT - SUBJECTIVE AND OBJECTIVE BOX
Follow Up:      Inverval History/ROS:Patient is a 84y old  Male who presents with a chief complaint of fevers, AMS (27 Mar 2020 13:36)    No fever  C/o fatigue  breathing room air.      Allergies    No Known Allergies    Intolerances        ANTIMICROBIALS:  caspofungin IVPB 50 every 24 hours  hydroxychloroquine    hydroxychloroquine 200 every 12 hours      OTHER MEDS:  acetaminophen   Tablet .. 650 milliGRAM(s) Oral every 6 hours PRN  heparin  Injectable 5000 Unit(s) SubCutaneous every 8 hours  lactobacillus acidophilus 1 Tablet(s) Oral two times a day  sodium chloride 0.9%. 1000 milliLiter(s) IV Continuous <Continuous>      Vital Signs Last 24 Hrs  T(C): 36.8 (27 Mar 2020 06:28), Max: 36.8 (27 Mar 2020 06:28)  T(F): 98.2 (27 Mar 2020 06:28), Max: 98.2 (27 Mar 2020 06:28)  HR: 83 (27 Mar 2020 06:28) (83 - 83)  BP: 133/81 (27 Mar 2020 06:28) (133/81 - 133/81)  BP(mean): --  RR: 18 (27 Mar 2020 06:28) (18 - 18)  SpO2: 96% (27 Mar 2020 06:28) (96% - 96%)    PHYSICAL EXAM:  General: [x ] non-toxic  HEAD/EYES: [ ] PERRL [ x] white sclera [ ] icterus  ENT:  [ ] normal [ x] supple [ ] thrush [ ] pharyngeal exudate  Cardiovascular:   [ ] murmur [ x] normal [ ] PPM/AICD  Respiratory:  [ x] clear to ausculation bilaterally  GI:  [ ]x soft, non-tender, normal bowel sounds  :  [ ] mendenhall [ ] no CVA tenderness   Musculoskeletal:  [ ] no synovitis  Neurologic:  [ ] non-focal exam   Skin:  [ x] no rash  Lymph: [ ] no lymphadenopathy  Psychiatric:  [ ] appropriate affect [ x] alert & oriented  Lines:  [x ] no phlebitis [ ] central line                                12.8   3.94  )-----------( 211      ( 27 Mar 2020 06:50 )             38.2       03-26    138  |  102  |  19  ----------------------------<  91  4.2   |  23  |  0.92    Ca    8.7      26 Mar 2020 07:00  Phos  2.8     03-26  Mg     2.3     03-26    TPro  6.7  /  Alb  3.1<L>  /  TBili  0.4  /  DBili  x   /  AST  53<H>  /  ALT  25  /  AlkPhos  56  03-26          MICROBIOLOGY:Culture Results:   No growth to date. (03-25-20 @ 06:03)  Culture Results:   No growth to date. (03-25-20 @ 06:03)  Culture Results:   No growth (03-24-20 @ 18:22)  Culture Results:   No growth to date. (03-23-20 @ 21:21)  Culture Results:   No growth to date. (03-23-20 @ 21:07)  Culture Results:   Growth in anaerobic bottle: Coag Negative Staphylococcus  Single set isolate, possible contaminant. Contact  Microbiology if susceptibility testing clinically  indicated.  ***Blood Panel PCR results on this specimen are available  approximately 3 hours after the Gram stain result.***  Gram stain, PCR, and/or culture results may not always  correspond due to difference in methodologies.  ************************************************************  This PCR assay was performed using [email or web address].  The following targets are tested for: Enterococcus,  vancomycin resistant enterococci, Listeria monocytogenes,  coagulase negative staphylococci, S. aureus,  methicillin resistant S. aureus, Streptococcus agalactiae  (Group B), S. pneumoniae, S. pyogenes (Group A),  Acinetobacter baumannii, Enterobacter cloacae, E. coli,  Klebsiella oxytoca, K. pneumoniae, Proteus sp.,  Serratia marcescens, Haemophilus influenzae,  Neisseria meningitidis, Pseudomonas aeruginosa, Candida  albicans, C. glabrata, C krusei, C parapsilosis,  C. tropicalis and the KPC resistance gene. (03-22-20 @ 20:18)      RADIOLOGY:

## 2020-03-27 NOTE — PROGRESS NOTE ADULT - PROBLEM SELECTOR PLAN 1
- patient with CXR showing b/l lung opacities concerning for PNA, possibly bacterial given 7% bandemia, however could be viral 2/2 COVID-19 also with bacteremia but likely contaminant ( coag neg staph) now with candida   - abx was d/c on 3/24 per ID recs  - on caspofungin for candidemia  - cont plaquenil  -urine legionella neg  -  blood cultures with coag neg staph likely contamination  -blood culture also with candida parapsilosis  - rpt blood culture from 23rd neg to date  - blood culture from 25th neg to date  - Urine culture neg  - Per ID ct a/p, echo and opthal eval when feasible due to covid status  - f/u ID recs, discussed with ACP   - weaned off of nasal cannula oxygen, sats ok, cont to monitor off oxygen  -No high-flow or BIPAP given COVID rule out if needed. Low threshold to consult MICU if in respiratory distress  - Plan discussed with ACP  - Updated pt's daughter Kayla on 3/25

## 2020-03-27 NOTE — PROGRESS NOTE ADULT - SUBJECTIVE AND OBJECTIVE BOX
Patient is a 84y old  Male who presents with a chief complaint of fevers, AMS (26 Mar 2020 15:15)      SUBJECTIVE / OVERNIGHT EVENTS: Pt seen and examined at 11:55am, no overnight events, pt denies any sob, cough better per pt, did not eat breakfast as he did not have an appetite, sats ok on RA, No other new issues reported by nsg.        MEDICATIONS  (STANDING):  caspofungin IVPB 50 milliGRAM(s) IV Intermittent every 24 hours  heparin  Injectable 5000 Unit(s) SubCutaneous every 8 hours  hydroxychloroquine   Oral   hydroxychloroquine 200 milliGRAM(s) Oral every 12 hours  lactobacillus acidophilus 1 Tablet(s) Oral two times a day  sodium chloride 0.9%. 1000 milliLiter(s) (50 mL/Hr) IV Continuous <Continuous>    MEDICATIONS  (PRN):  acetaminophen   Tablet .. 650 milliGRAM(s) Oral every 6 hours PRN Temp greater or equal to 38C (100.4F)      Vital Signs Last 24 Hrs  T(C): 36.8 (27 Mar 2020 06:28), Max: 36.8 (27 Mar 2020 06:28)  T(F): 98.2 (27 Mar 2020 06:28), Max: 98.2 (27 Mar 2020 06:28)  HR: 83 (27 Mar 2020 06:28) (83 - 83)  BP: 133/81 (27 Mar 2020 06:28) (133/81 - 133/81)  BP(mean): --  RR: 18 (27 Mar 2020 06:28) (18 - 18)  SpO2: 96% (27 Mar 2020 06:28) (96% - 96%)  CAPILLARY BLOOD GLUCOSE        I&O's Summary      PHYSICAL EXAM:  GENERAL: NAD, thinly built male  CHEST/LUNG: Clear to auscultation bilaterally  HEART: Regular rate and rhythm  ABDOMEN: Soft, Nontender, Nondistended  EXTREMITIES: no LE edema  PSYCH: Calm  NEUROLOGY: AA, oriented to self and place    LABS:                        12.8   3.94  )-----------( 211      ( 27 Mar 2020 06:50 )             38.2     03-26    138  |  102  |  19  ----------------------------<  91  4.2   |  23  |  0.92    Ca    8.7      26 Mar 2020 07:00  Phos  2.8     03-26  Mg     2.3     03-26    TPro  6.7  /  Alb  3.1<L>  /  TBili  0.4  /  DBili  x   /  AST  53<H>  /  ALT  25  /  AlkPhos  56  03-26              RADIOLOGY & ADDITIONAL TESTS:    Imaging Personally Reviewed:    Consultant(s) Notes Reviewed:      Care Discussed with Consultants/Other Providers:

## 2020-03-28 LAB
ALBUMIN SERPL ELPH-MCNC: 3.1 G/DL — LOW (ref 3.3–5)
ALP SERPL-CCNC: 59 U/L — SIGNIFICANT CHANGE UP (ref 40–120)
ALT FLD-CCNC: 25 U/L — SIGNIFICANT CHANGE UP (ref 4–41)
ANION GAP SERPL CALC-SCNC: 16 MMO/L — HIGH (ref 7–14)
AST SERPL-CCNC: 44 U/L — HIGH (ref 4–40)
BASOPHILS # BLD AUTO: 0.02 K/UL — SIGNIFICANT CHANGE UP (ref 0–0.2)
BASOPHILS NFR BLD AUTO: 0.4 % — SIGNIFICANT CHANGE UP (ref 0–2)
BILIRUB SERPL-MCNC: 0.6 MG/DL — SIGNIFICANT CHANGE UP (ref 0.2–1.2)
BUN SERPL-MCNC: 19 MG/DL — SIGNIFICANT CHANGE UP (ref 7–23)
CALCIUM SERPL-MCNC: 8.8 MG/DL — SIGNIFICANT CHANGE UP (ref 8.4–10.5)
CHLORIDE SERPL-SCNC: 105 MMOL/L — SIGNIFICANT CHANGE UP (ref 98–107)
CO2 SERPL-SCNC: 21 MMOL/L — LOW (ref 22–31)
CREAT SERPL-MCNC: 0.89 MG/DL — SIGNIFICANT CHANGE UP (ref 0.5–1.3)
CULTURE RESULTS: SIGNIFICANT CHANGE UP
CULTURE RESULTS: SIGNIFICANT CHANGE UP
EOSINOPHIL # BLD AUTO: 0.02 K/UL — SIGNIFICANT CHANGE UP (ref 0–0.5)
EOSINOPHIL NFR BLD AUTO: 0.4 % — SIGNIFICANT CHANGE UP (ref 0–6)
GLUCOSE SERPL-MCNC: 90 MG/DL — SIGNIFICANT CHANGE UP (ref 70–99)
HCT VFR BLD CALC: 40.4 % — SIGNIFICANT CHANGE UP (ref 39–50)
HGB BLD-MCNC: 13.1 G/DL — SIGNIFICANT CHANGE UP (ref 13–17)
IMM GRANULOCYTES NFR BLD AUTO: 1.3 % — SIGNIFICANT CHANGE UP (ref 0–1.5)
LYMPHOCYTES # BLD AUTO: 0.56 K/UL — LOW (ref 1–3.3)
LYMPHOCYTES # BLD AUTO: 10.7 % — LOW (ref 13–44)
MAGNESIUM SERPL-MCNC: 2.1 MG/DL — SIGNIFICANT CHANGE UP (ref 1.6–2.6)
MCHC RBC-ENTMCNC: 25.7 PG — LOW (ref 27–34)
MCHC RBC-ENTMCNC: 32.4 % — SIGNIFICANT CHANGE UP (ref 32–36)
MCV RBC AUTO: 79.4 FL — LOW (ref 80–100)
MONOCYTES # BLD AUTO: 0.57 K/UL — SIGNIFICANT CHANGE UP (ref 0–0.9)
MONOCYTES NFR BLD AUTO: 10.9 % — SIGNIFICANT CHANGE UP (ref 2–14)
NEUTROPHILS # BLD AUTO: 3.98 K/UL — SIGNIFICANT CHANGE UP (ref 1.8–7.4)
NEUTROPHILS NFR BLD AUTO: 76.3 % — SIGNIFICANT CHANGE UP (ref 43–77)
NRBC # FLD: 0 K/UL — SIGNIFICANT CHANGE UP (ref 0–0)
PHOSPHATE SERPL-MCNC: 2.7 MG/DL — SIGNIFICANT CHANGE UP (ref 2.5–4.5)
PLATELET # BLD AUTO: 245 K/UL — SIGNIFICANT CHANGE UP (ref 150–400)
PMV BLD: 9.9 FL — SIGNIFICANT CHANGE UP (ref 7–13)
POTASSIUM SERPL-MCNC: 3.9 MMOL/L — SIGNIFICANT CHANGE UP (ref 3.5–5.3)
POTASSIUM SERPL-SCNC: 3.9 MMOL/L — SIGNIFICANT CHANGE UP (ref 3.5–5.3)
PROT SERPL-MCNC: 6.9 G/DL — SIGNIFICANT CHANGE UP (ref 6–8.3)
RBC # BLD: 5.09 M/UL — SIGNIFICANT CHANGE UP (ref 4.2–5.8)
RBC # FLD: 14.7 % — HIGH (ref 10.3–14.5)
SODIUM SERPL-SCNC: 142 MMOL/L — SIGNIFICANT CHANGE UP (ref 135–145)
SPECIMEN SOURCE: SIGNIFICANT CHANGE UP
SPECIMEN SOURCE: SIGNIFICANT CHANGE UP
WBC # BLD: 5.22 K/UL — SIGNIFICANT CHANGE UP (ref 3.8–10.5)
WBC # FLD AUTO: 5.22 K/UL — SIGNIFICANT CHANGE UP (ref 3.8–10.5)

## 2020-03-28 PROCEDURE — 99233 SBSQ HOSP IP/OBS HIGH 50: CPT

## 2020-03-28 RX ADMIN — HEPARIN SODIUM 5000 UNIT(S): 5000 INJECTION INTRAVENOUS; SUBCUTANEOUS at 21:12

## 2020-03-28 RX ADMIN — Medication 200 MILLIGRAM(S): at 17:27

## 2020-03-28 RX ADMIN — Medication 200 MILLIGRAM(S): at 06:12

## 2020-03-28 RX ADMIN — HEPARIN SODIUM 5000 UNIT(S): 5000 INJECTION INTRAVENOUS; SUBCUTANEOUS at 13:25

## 2020-03-28 RX ADMIN — Medication 1 TABLET(S): at 06:03

## 2020-03-28 RX ADMIN — CASPOFUNGIN ACETATE 260 MILLIGRAM(S): 7 INJECTION, POWDER, LYOPHILIZED, FOR SOLUTION INTRAVENOUS at 13:25

## 2020-03-28 RX ADMIN — HEPARIN SODIUM 5000 UNIT(S): 5000 INJECTION INTRAVENOUS; SUBCUTANEOUS at 06:03

## 2020-03-28 RX ADMIN — Medication 1 TABLET(S): at 17:27

## 2020-03-28 NOTE — CHART NOTE - NSCHARTNOTEFT_GEN_A_CORE
Left 2 VM w/ daughter Kayla ()  wanted to update the daughter about pt's hospital course and dispo plan. Left the floor number to call back.     Neris Rodriguez, ACP NP [phone]

## 2020-03-28 NOTE — PROGRESS NOTE ADULT - SUBJECTIVE AND OBJECTIVE BOX
Patient is a 84y old  Male who presents with a chief complaint of fevers, AMS (27 Mar 2020 14:40)      SUBJECTIVE / OVERNIGHT EVENTS: Pt seen and examined at 12:40pm, no overnight events, pt denies any sob, cough better, sats ok on RA, No other new issues reported by nsg.          MEDICATIONS  (STANDING):  caspofungin IVPB 50 milliGRAM(s) IV Intermittent every 24 hours  heparin  Injectable 5000 Unit(s) SubCutaneous every 8 hours  hydroxychloroquine   Oral   hydroxychloroquine 200 milliGRAM(s) Oral every 12 hours  lactobacillus acidophilus 1 Tablet(s) Oral two times a day  sodium chloride 0.9%. 1000 milliLiter(s) (50 mL/Hr) IV Continuous <Continuous>    MEDICATIONS  (PRN):  acetaminophen   Tablet .. 650 milliGRAM(s) Oral every 6 hours PRN Temp greater or equal to 38C (100.4F)      Vital Signs Last 24 Hrs  T(C): 37.1 (28 Mar 2020 13:32), Max: 37.1 (28 Mar 2020 13:32)  T(F): 98.8 (28 Mar 2020 13:32), Max: 98.8 (28 Mar 2020 13:32)  HR: 85 (28 Mar 2020 13:32) (83 - 88)  BP: 138/90 (28 Mar 2020 13:32) (117/65 - 143/75)  BP(mean): --  RR: 16 (28 Mar 2020 13:32) (16 - 18)  SpO2: 95% (28 Mar 2020 13:32) (94% - 96%)  CAPILLARY BLOOD GLUCOSE        I&O's Summary      PHYSICAL EXAM:  GENERAL: NAD, thinly built male  CHEST/LUNG: Deferred ausculatation, breathing comfortably  HEART: Regular rate and rhythm  ABDOMEN: Soft, Nontender, Nondistended  EXTREMITIES: no LE edema  PSYCH: Calm  NEUROLOGY: AA, oriented to self    LABS:                        13.1   5.22  )-----------( 245      ( 28 Mar 2020 06:44 )             40.4     03-28    142  |  105  |  19  ----------------------------<  90  3.9   |  21<L>  |  0.89    Ca    8.8      28 Mar 2020 06:44  Phos  2.7     03-28  Mg     2.1     03-28    TPro  6.9  /  Alb  3.1<L>  /  TBili  0.6  /  DBili  x   /  AST  44<H>  /  ALT  25  /  AlkPhos  59  03-28              RADIOLOGY & ADDITIONAL TESTS:    Imaging Personally Reviewed:    Consultant(s) Notes Reviewed:      Care Discussed with Consultants/Other Providers:

## 2020-03-28 NOTE — PROGRESS NOTE ADULT - PROBLEM SELECTOR PLAN 1
- patient with CXR showing b/l lung opacities concerning for PNA, possibly bacterial given 7% bandemia, however could be viral 2/2 COVID-19 also with bacteremia but likely contaminant ( coag neg staph) now with candida   - abx was d/c on 3/24 per ID recs  - on caspofungin for candidemia  - cont plaquenil  -urine legionella neg  -  blood cultures with coag neg staph likely contamination  -blood culture also with candida parapsilosis  - rpt blood culture from 23rd neg to date  - blood culture from 25th neg to date  - Urine culture neg  - Per ID ct a/p, echo and opthal eval when off isolation due to covid status  - f/u ID recs, discussed with ACP   - weaned off of nasal cannula oxygen, sats ok, cont to monitor off oxygen  -No high-flow or BIPAP given COVID rule out if needed. Low threshold to consult MICU if in respiratory distress  - Plan discussed with ACP  - Updated pt's daughter Kayla on 3/25 - patient with CXR showing b/l lung opacities concerning for PNA, possibly bacterial given 7% bandemia, however could be viral 2/2 COVID-19 also with bacteremia but likely contaminant ( coag neg staph) now with candida   - abx was d/c on 3/24 per ID recs  - on caspofungin for candidemia till 3/30 then to start on fluconazole  - cont plaquenil  -urine legionella neg  -  blood cultures with coag neg staph likely contamination  -blood culture also with candida parapsilosis  - rpt blood culture from 23rd neg to date  - blood culture from 25th neg to date  - Urine culture neg  - Per ID ct a/p, echo and opthal eval when off isolation due to covid status  - f/u ID recs, discussed with ACP   - weaned off of nasal cannula oxygen, sats ok, cont to monitor off oxygen  -No high-flow or BIPAP given COVID rule out if needed. Low threshold to consult MICU if in respiratory distress  - Plan discussed with ACP  - Updated pt's daughter Kayla on 3/25

## 2020-03-29 PROCEDURE — 99233 SBSQ HOSP IP/OBS HIGH 50: CPT

## 2020-03-29 RX ORDER — HYDROXYCHLOROQUINE SULFATE 200 MG
200 TABLET ORAL ONCE
Refills: 0 | Status: COMPLETED | OUTPATIENT
Start: 2020-03-29 | End: 2020-03-29

## 2020-03-29 RX ADMIN — CASPOFUNGIN ACETATE 260 MILLIGRAM(S): 7 INJECTION, POWDER, LYOPHILIZED, FOR SOLUTION INTRAVENOUS at 11:38

## 2020-03-29 RX ADMIN — HEPARIN SODIUM 5000 UNIT(S): 5000 INJECTION INTRAVENOUS; SUBCUTANEOUS at 05:18

## 2020-03-29 RX ADMIN — HEPARIN SODIUM 5000 UNIT(S): 5000 INJECTION INTRAVENOUS; SUBCUTANEOUS at 13:45

## 2020-03-29 RX ADMIN — HEPARIN SODIUM 5000 UNIT(S): 5000 INJECTION INTRAVENOUS; SUBCUTANEOUS at 22:06

## 2020-03-29 NOTE — PROGRESS NOTE ADULT - PROBLEM SELECTOR PLAN 8
- DVT ppx: HSQ  - Diet: regular  - Dispo: pending clinical improvement, hold off on PT consult
- hyponatremia improved
- hyponatremia to 131 in the setting of poor po intake vs PNA, f/u today's labs  - continue to monitor  - if downtrending, consider urine studies
- hyponatremia to 135 on 3/23   - continue to monitor  - if downtrending, consider urine studies
- hyponatremia to 132, cont to trend  - if downtrending, consider urine studies

## 2020-03-29 NOTE — PROGRESS NOTE ADULT - SUBJECTIVE AND OBJECTIVE BOX
Patient is a 84y old  Male who presents with a chief complaint of fevers, AMS (28 Mar 2020 15:00)      SUBJECTIVE / OVERNIGHT EVENTS: Pt seen and examined at 11:20am, no overnight events, afebrile, pt denies any sob, cough better. Has poor appetite per nsg does not want to eat and had refused the last dose of plaquenil this am, sats ok on nasal cannula oxygen. No other new issues reported by nsg.      MEDICATIONS  (STANDING):  caspofungin IVPB 50 milliGRAM(s) IV Intermittent every 24 hours  heparin  Injectable 5000 Unit(s) SubCutaneous every 8 hours  hydroxychloroquine 200 milliGRAM(s) Oral once  lactobacillus acidophilus 1 Tablet(s) Oral two times a day  sodium chloride 0.9%. 1000 milliLiter(s) (50 mL/Hr) IV Continuous <Continuous>    MEDICATIONS  (PRN):  acetaminophen   Tablet .. 650 milliGRAM(s) Oral every 6 hours PRN Temp greater or equal to 38C (100.4F)      Vital Signs Last 24 Hrs  T(C): 36.3 (29 Mar 2020 13:43), Max: 36.7 (28 Mar 2020 21:10)  T(F): 97.4 (29 Mar 2020 13:43), Max: 98 (28 Mar 2020 21:10)  HR: 85 (29 Mar 2020 13:43) (85 - 88)  BP: 147/83 (29 Mar 2020 13:43) (131/77 - 153/81)  BP(mean): --  RR: 18 (29 Mar 2020 13:43) (17 - 18)  SpO2: 93% (29 Mar 2020 13:43) (93% - 97%)  CAPILLARY BLOOD GLUCOSE        I&O's Summary    29 Mar 2020 07:01  -  29 Mar 2020 14:00  --------------------------------------------------------  IN: 250 mL / OUT: 0 mL / NET: 250 mL        PHYSICAL EXAM:  GENERAL: NAD, thinly built male  CHEST/LUNG: Deferred ausculatation, breathing comfortably  HEART: no evidence of tachycardia on vitals  ABDOMEN: Soft, Nontender, Nondistended  EXTREMITIES: no LE edema  PSYCH: Calm  NEUROLOGY: AA, oriented to self        LABS:                        13.1   5.22  )-----------( 245      ( 28 Mar 2020 06:44 )             40.4     03-28    142  |  105  |  19  ----------------------------<  90  3.9   |  21<L>  |  0.89    Ca    8.8      28 Mar 2020 06:44  Phos  2.7     03-28  Mg     2.1     03-28    TPro  6.9  /  Alb  3.1<L>  /  TBili  0.6  /  DBili  x   /  AST  44<H>  /  ALT  25  /  AlkPhos  59  03-28              RADIOLOGY & ADDITIONAL TESTS:    Imaging Personally Reviewed:    Consultant(s) Notes Reviewed:      Care Discussed with Consultants/Other Providers:

## 2020-03-29 NOTE — PROGRESS NOTE ADULT - PROBLEM SELECTOR PLAN 5
- patient with transaminitis in the setting of infection/ COVID  -, trend LFTs - patient with transaminitis in the setting of infection/ COVID  -LFTs stable  - cont to trend

## 2020-03-29 NOTE — PROGRESS NOTE ADULT - PROBLEM SELECTOR PLAN 1
- patient with CXR showing b/l lung opacities concerning for PNA, possibly bacterial given 7% bandemia, however could be viral 2/2 COVID-19 also with bacteremia but likely contaminant ( coag neg staph) now with candida   - abx was d/c on 3/24 per ID recs  - on caspofungin for candidemia till 3/30 then to start on fluconazole  - cont plaquenil- had refused the last dose of plaquenil today, encouraged him to take it  -urine legionella neg  -  blood cultures with coag neg staph likely contamination  -blood culture also with candida parapsilosis  - rpt blood culture from 23rd neg to date  - blood culture from 25th neg to date  - Urine culture neg  - Per ID ct a/p, echo and opthal eval when off isolation due to covid status, can try on Monday when can be weaned off oxygen  - f/u ID recs, discussed with ACP   - weaned off of nasal cannula oxygen, sats ok, cont to monitor off oxygen  -No high-flow or BIPAP given COVID rule out if needed. Low threshold to consult MICU if in respiratory distress  - Plan discussed with ACP  - Updated pt's daughter Kayla on 3/29 - patient with CXR showing b/l lung opacities concerning for PNA, possibly bacterial given 7% bandemia, however could be viral 2/2 COVID-19 also with bacteremia but likely contaminant ( coag neg staph) now with candida   - abx was d/c on 3/24 per ID recs  - on caspofungin for candidemia till 3/30 then to start on fluconazole on 4/1 thru 4/22  - cont plaquenil- had refused the last dose of plaquenil today, encouraged him to take it  -urine legionella neg  -  blood cultures with coag neg staph likely contamination  -blood culture also with candida parapsilosis  - rpt blood culture from 23rd neg to date  - blood culture from 25th neg to date  - Urine culture neg  - Per ID ct a/p, echo and opthal eval when off isolation due to covid status, can try on Monday when can be weaned off oxygen  - f/u ID recs, discussed with ACP   - weaned off of nasal cannula oxygen, sats ok, cont to monitor off oxygen  -No high-flow or BIPAP given COVID rule out if needed. Low threshold to consult MICU if in respiratory distress  - Plan discussed with ACP  - Updated pt's daughter Kayla on 3/29 - patient with CXR showing b/l lung opacities concerning for PNA, possibly bacterial given 7% bandemia, however could be viral 2/2 COVID-19 also with bacteremia but likely contaminant ( coag neg staph) now with candida   - abx was d/c on 3/24 per ID recs  - on caspofungin for candidemia till 3/30 then to start on fluconazole on 4/1 thru 4/22  - cont plaquenil- had refused the last dose of plaquenil today, encouraged him to take it  -urine legionella neg  -  blood cultures with coag neg staph likely contamination  -blood culture also with candida parapsilosis  - rpt blood culture from 23rd neg to date  - blood culture from 25th neg to date  - Urine culture neg  - Per ID ct a/p, echo and opthal eval when off isolation due to covid status, can try on Monday when can be weaned off oxygen  - f/u ID recs, discussed with ACP   - wean of nasal cannula oxygen as tolerated, sats ok  -No high-flow or BIPAP given COVID rule out if needed. Low threshold to consult MICU if in respiratory distress  - Plan discussed with ACP  - Updated pt's daughter Kayla on 3/29

## 2020-03-29 NOTE — PROGRESS NOTE ADULT - PROBLEM SELECTOR PLAN 7
- likely in the setting of infection, however no concern for bleeding at this time  - most likely in the setting of infection  - continue to trend - likely in the setting of infection, however no concern for bleeding at this time  - most likely in the setting of infection  - improved

## 2020-03-29 NOTE — PROGRESS NOTE ADULT - PROBLEM SELECTOR PLAN 6
- patient with microcytic anemia possibly iron deficiency vs anemia of chronic disease. No concern for acute bleed.  - Elevated ferritin likely due to aocd  - continue to trend
- patient with microcytic anemia possibly iron deficiency vs anemia of chronic disease. No concern for acute bleed.  - f/u iron studies  - continue to trend
- patient with microcytic anemia possibly iron deficiency vs anemia of chronic disease. No concern for acute bleed.  - f/u iron studies  - continue to trend
- patient with microcytic anemia possibly iron deficiency vs anemia of chronic disease. No concern for acute bleed.  - Elevated ferriting likely due to aocd  - continue to trend

## 2020-03-29 NOTE — PROGRESS NOTE ADULT - PROBLEM SELECTOR PROBLEM 8
Hyponatremia
Preventive measure
Hyponatremia

## 2020-03-29 NOTE — PROGRESS NOTE ADULT - PROBLEM SELECTOR PROBLEM 9
Discharge planning issues
Preventive measure

## 2020-03-29 NOTE — PROGRESS NOTE ADULT - PROBLEM SELECTOR PLAN 9
- DVT ppx: HSQ  - Diet: regular  - Dispo: pending clinical improvement, hold off on PT consult
Transitions of Care Status:  1.  Name of PCP:  2.  PCP Contacted on Admission: [ ] Y    [ ] N    3.  PCP contacted at Discharge: [ ] Y    [ ] N    [ ] N/A  4.  Post-Discharge Appointment Date and Location:  5.  Summary of Handoff given to PCP:
- DVT ppx: HSQ  - Diet: regular  - Dispo: pending clinical improvement, hold off on PT consult

## 2020-03-30 DIAGNOSIS — B37.7 CANDIDAL SEPSIS: ICD-10-CM

## 2020-03-30 LAB
ALBUMIN SERPL ELPH-MCNC: 3 G/DL — LOW (ref 3.3–5)
ALP SERPL-CCNC: 58 U/L — SIGNIFICANT CHANGE UP (ref 40–120)
ALT FLD-CCNC: 27 U/L — SIGNIFICANT CHANGE UP (ref 4–41)
ANION GAP SERPL CALC-SCNC: 17 MMO/L — HIGH (ref 7–14)
AST SERPL-CCNC: 37 U/L — SIGNIFICANT CHANGE UP (ref 4–40)
BASOPHILS # BLD AUTO: 0.04 K/UL — SIGNIFICANT CHANGE UP (ref 0–0.2)
BASOPHILS NFR BLD AUTO: 0.5 % — SIGNIFICANT CHANGE UP (ref 0–2)
BILIRUB SERPL-MCNC: 0.7 MG/DL — SIGNIFICANT CHANGE UP (ref 0.2–1.2)
BUN SERPL-MCNC: 25 MG/DL — HIGH (ref 7–23)
CALCIUM SERPL-MCNC: 9.2 MG/DL — SIGNIFICANT CHANGE UP (ref 8.4–10.5)
CHLORIDE SERPL-SCNC: 106 MMOL/L — SIGNIFICANT CHANGE UP (ref 98–107)
CO2 SERPL-SCNC: 21 MMOL/L — LOW (ref 22–31)
CREAT SERPL-MCNC: 0.9 MG/DL — SIGNIFICANT CHANGE UP (ref 0.5–1.3)
CULTURE RESULTS: SIGNIFICANT CHANGE UP
CULTURE RESULTS: SIGNIFICANT CHANGE UP
EOSINOPHIL # BLD AUTO: 0.03 K/UL — SIGNIFICANT CHANGE UP (ref 0–0.5)
EOSINOPHIL NFR BLD AUTO: 0.4 % — SIGNIFICANT CHANGE UP (ref 0–6)
GLUCOSE SERPL-MCNC: 97 MG/DL — SIGNIFICANT CHANGE UP (ref 70–99)
HCT VFR BLD CALC: 41.1 % — SIGNIFICANT CHANGE UP (ref 39–50)
HGB BLD-MCNC: 13.4 G/DL — SIGNIFICANT CHANGE UP (ref 13–17)
IMM GRANULOCYTES NFR BLD AUTO: 1.7 % — HIGH (ref 0–1.5)
LYMPHOCYTES # BLD AUTO: 0.74 K/UL — LOW (ref 1–3.3)
LYMPHOCYTES # BLD AUTO: 9.1 % — LOW (ref 13–44)
MAGNESIUM SERPL-MCNC: 2.2 MG/DL — SIGNIFICANT CHANGE UP (ref 1.6–2.6)
MCHC RBC-ENTMCNC: 26 PG — LOW (ref 27–34)
MCHC RBC-ENTMCNC: 32.6 % — SIGNIFICANT CHANGE UP (ref 32–36)
MCV RBC AUTO: 79.7 FL — LOW (ref 80–100)
MONOCYTES # BLD AUTO: 1 K/UL — HIGH (ref 0–0.9)
MONOCYTES NFR BLD AUTO: 12.4 % — SIGNIFICANT CHANGE UP (ref 2–14)
NEUTROPHILS # BLD AUTO: 6.14 K/UL — SIGNIFICANT CHANGE UP (ref 1.8–7.4)
NEUTROPHILS NFR BLD AUTO: 75.9 % — SIGNIFICANT CHANGE UP (ref 43–77)
NRBC # FLD: 0 K/UL — SIGNIFICANT CHANGE UP (ref 0–0)
PHOSPHATE SERPL-MCNC: 3.1 MG/DL — SIGNIFICANT CHANGE UP (ref 2.5–4.5)
PLATELET # BLD AUTO: 363 K/UL — SIGNIFICANT CHANGE UP (ref 150–400)
PMV BLD: 9.7 FL — SIGNIFICANT CHANGE UP (ref 7–13)
POTASSIUM SERPL-MCNC: 3.5 MMOL/L — SIGNIFICANT CHANGE UP (ref 3.5–5.3)
POTASSIUM SERPL-SCNC: 3.5 MMOL/L — SIGNIFICANT CHANGE UP (ref 3.5–5.3)
PROT SERPL-MCNC: 7.1 G/DL — SIGNIFICANT CHANGE UP (ref 6–8.3)
RBC # BLD: 5.16 M/UL — SIGNIFICANT CHANGE UP (ref 4.2–5.8)
RBC # FLD: 14.6 % — HIGH (ref 10.3–14.5)
SODIUM SERPL-SCNC: 144 MMOL/L — SIGNIFICANT CHANGE UP (ref 135–145)
SPECIMEN SOURCE: SIGNIFICANT CHANGE UP
SPECIMEN SOURCE: SIGNIFICANT CHANGE UP
WBC # BLD: 8.09 K/UL — SIGNIFICANT CHANGE UP (ref 3.8–10.5)
WBC # FLD AUTO: 8.09 K/UL — SIGNIFICANT CHANGE UP (ref 3.8–10.5)

## 2020-03-30 PROCEDURE — 99232 SBSQ HOSP IP/OBS MODERATE 35: CPT

## 2020-03-30 PROCEDURE — 99234 HOSP IP/OBS SM DT SF/LOW 45: CPT

## 2020-03-30 RX ORDER — FLUCONAZOLE 150 MG/1
400 TABLET ORAL DAILY
Refills: 0 | Status: DISCONTINUED | OUTPATIENT
Start: 2020-03-31 | End: 2020-04-01

## 2020-03-30 RX ADMIN — HEPARIN SODIUM 5000 UNIT(S): 5000 INJECTION INTRAVENOUS; SUBCUTANEOUS at 06:07

## 2020-03-30 RX ADMIN — Medication 1 TABLET(S): at 16:53

## 2020-03-30 RX ADMIN — HEPARIN SODIUM 5000 UNIT(S): 5000 INJECTION INTRAVENOUS; SUBCUTANEOUS at 21:19

## 2020-03-30 RX ADMIN — HEPARIN SODIUM 5000 UNIT(S): 5000 INJECTION INTRAVENOUS; SUBCUTANEOUS at 12:22

## 2020-03-30 RX ADMIN — CASPOFUNGIN ACETATE 260 MILLIGRAM(S): 7 INJECTION, POWDER, LYOPHILIZED, FOR SOLUTION INTRAVENOUS at 12:21

## 2020-03-30 RX ADMIN — Medication 1 TABLET(S): at 06:07

## 2020-03-30 NOTE — PROGRESS NOTE ADULT - PROBLEM SELECTOR PLAN 1
ID input appreciated; s/p Capsofungin, will start Fluconazole tomorrow  Will discuss with ID timing of CT A/P for workup of possible source

## 2020-03-30 NOTE — PROGRESS NOTE ADULT - SUBJECTIVE AND OBJECTIVE BOX
CCU Progress Note    S/24 Events: No acute events overnight.     O:  T(C): 36.5 (03-30-20 @ 12:14), Max: 37.3 (03-29-20 @ 22:04)  HR: 84 (03-30-20 @ 12:14) (84 - 95)  BP: 129/80 (03-30-20 @ 12:14) (129/80 - 155/86)  RR: 18 (03-30-20 @ 12:14) (17 - 18)  SpO2: 96% (03-30-20 @ 12:14) (94% - 96%)    O/E:  Gen: NAD  HEENT: EOMI  CV: RRR, normal S1 + S2, no m/r/g  Lungs: CTAB  Abd: soft, non-tender  Ext: No edema    Labs:                        13.4   8.09  )-----------( 363      ( 30 Mar 2020 06:11 )             41.1     03-30    144  |  106  |  25<H>  ----------------------------<  97  3.5   |  21<L>  |  0.90    Ca    9.2      30 Mar 2020 06:11  Phos  3.1     03-30  Mg     2.2     03-30    TPro  7.1  /  Alb  3.0<L>  /  TBili  0.7  /  DBili  x   /  AST  37  /  ALT  27  /  AlkPhos  58  03-30      Meds:  MEDICATIONS  (STANDING):  heparin  Injectable 5000 Unit(s) SubCutaneous every 8 hours  lactobacillus acidophilus 1 Tablet(s) Oral two times a day      A/P: S/24 Events: No acute events overnight.     O:  T(C): 36.5 (03-30-20 @ 12:14), Max: 37.3 (03-29-20 @ 22:04)  HR: 84 (03-30-20 @ 12:14) (84 - 95)  BP: 129/80 (03-30-20 @ 12:14) (129/80 - 155/86)  RR: 18 (03-30-20 @ 12:14) (17 - 18)  SpO2: 96% (03-30-20 @ 12:14) (94% - 96%)    O/E:  Gen: NAD  HEENT: EOMI  CV: RRR, normal S1 + S2, no m/r/g  Lungs: CTAB  Abd: soft, non-tender  Ext: No edema    Labs:                        13.4   8.09  )-----------( 363      ( 30 Mar 2020 06:11 )             41.1     03-30    144  |  106  |  25<H>  ----------------------------<  97  3.5   |  21<L>  |  0.90    Ca    9.2      30 Mar 2020 06:11  Phos  3.1     03-30  Mg     2.2     03-30    TPro  7.1  /  Alb  3.0<L>  /  TBili  0.7  /  DBili  x   /  AST  37  /  ALT  27  /  AlkPhos  58  03-30      Meds:  MEDICATIONS  (STANDING):  heparin  Injectable 5000 Unit(s) SubCutaneous every 8 hours  lactobacillus acidophilus 1 Tablet(s) Oral two times a day      A/P:  86 yo M w/ PMHx prostate CA on lupron, GERD BIBEMS from home with fevers, found to have COVID 19 and Candidemia

## 2020-03-30 NOTE — PROGRESS NOTE ADULT - SUBJECTIVE AND OBJECTIVE BOX
Follow Up:      Inverval History/ROS:Patient is a 84y old  Male who presents with a chief complaint of fevers, AMS (29 Mar 2020 14:00)    No fever  No events    Allergies    No Known Allergies    Intolerances        ANTIMICROBIALS:      OTHER MEDS:  acetaminophen   Tablet .. 650 milliGRAM(s) Oral every 6 hours PRN  heparin  Injectable 5000 Unit(s) SubCutaneous every 8 hours  lactobacillus acidophilus 1 Tablet(s) Oral two times a day      Vital Signs Last 24 Hrs  T(C): 36.5 (30 Mar 2020 12:14), Max: 37.3 (29 Mar 2020 22:04)  T(F): 97.7 (30 Mar 2020 12:14), Max: 99.1 (29 Mar 2020 22:04)  HR: 84 (30 Mar 2020 12:14) (84 - 95)  BP: 129/80 (30 Mar 2020 12:14) (129/80 - 155/86)  BP(mean): --  RR: 18 (30 Mar 2020 12:14) (17 - 18)  SpO2: 96% (30 Mar 2020 12:14) (94% - 96%)    PHYSICAL EXAM:  General: [x ] non-toxic  HEAD/EYES: [ ] PERRL [x ] white sclera [ ] icterus  ENT:  [ ] normal [x ] supple [ ] thrush [ ] pharyngeal exudate  Cardiovascular:   [ ] murmur [ x] normal [ ] PPM/AICD  Respiratory:  [x ] clear to ausculation bilaterally  GI:  [x ] soft, non-tender, normal bowel sounds  :  [ ] m[place] x ] no CVA tenderness   Musculoskeletal:  [ ] no synovitis  Neurologic:  [ ] non-focal exam   Skin:  [ x] no rash  Lymph: [ ] no lymphadenopathy  Psychiatric:  [ ] appropriate affect [x ] alert & oriented  Lines:  [ ]x no phlebitis [ ] central line                                13.4   8.09  )-----------( 363      ( 30 Mar 2020 06:11 )             41.1       03-30    144  |  106  |  25<H>  ----------------------------<  97  3.5   |  21<L>  |  0.90    Ca    9.2      30 Mar 2020 06:11  Phos  3.1     03-30  Mg     2.2     03-30    TPro  7.1  /  Alb  3.0<L>  /  TBili  0.7  /  DBili  x   /  AST  37  /  ALT  27  /  AlkPhos  58  03-30          MICROBIOLOGY:Culture Results:   No growth at 5 days. (03-25-20 @ 05:21)  Culture Results:   No growth at 5 days. (03-25-20 @ 05:20)  Culture Results:   No growth (03-24-20 @ 18:22)      RADIOLOGY:

## 2020-03-31 PROCEDURE — 99234 HOSP IP/OBS SM DT SF/LOW 45: CPT

## 2020-03-31 RX ADMIN — HEPARIN SODIUM 5000 UNIT(S): 5000 INJECTION INTRAVENOUS; SUBCUTANEOUS at 14:20

## 2020-03-31 RX ADMIN — HEPARIN SODIUM 5000 UNIT(S): 5000 INJECTION INTRAVENOUS; SUBCUTANEOUS at 04:52

## 2020-03-31 RX ADMIN — Medication 1 TABLET(S): at 04:52

## 2020-03-31 RX ADMIN — FLUCONAZOLE 400 MILLIGRAM(S): 150 TABLET ORAL at 17:30

## 2020-03-31 RX ADMIN — Medication 1 TABLET(S): at 17:30

## 2020-03-31 RX ADMIN — HEPARIN SODIUM 5000 UNIT(S): 5000 INJECTION INTRAVENOUS; SUBCUTANEOUS at 21:18

## 2020-03-31 NOTE — PHYSICAL THERAPY INITIAL EVALUATION ADULT - DISCHARGE DISPOSITION, PT EVAL
Patient is not rehab candidate as he is unable to follow commands/actively participate in session. Patient will be removed from PT program, reorder if patient able to actively participate/home/no skilled PT needs home/Patient is not rehab candidate as he is unable to follow commands/actively participate in session. will continue to follow for a couple sessions to see if patient will begin to participate better/no skilled PT needs

## 2020-03-31 NOTE — PHYSICAL THERAPY INITIAL EVALUATION ADULT - CRITERIA FOR SKILLED THERAPEUTIC INTERVENTIONS
anticipated discharge recommendation impairments found/functional limitations in following categories/risk reduction/prevention/therapy frequency/anticipated discharge recommendation/anticipated equipment needs at discharge/rehab potential/predicted duration of therapy intervention

## 2020-03-31 NOTE — CHART NOTE - NSCHARTNOTEFT_GEN_A_CORE
Patient continues to be acutely confused however vitals remain stable on RA and pt afebrile. Per d/w patient's brother Abdiel [phone] (C), [phone] (H) and daughter Kayla [phone] patient previously well functioning and independent at baseline prior to admission. He had been living with Abdiel. Family states the patient had been on vacation from Dec 2019 til early March 2020 in the [facility], upon returning he seemed to be at his baseline MS.     His confusion and fever onset appeared around 2 days prior to admission after having possible contact w/ friends who later were discovered to be COVID +. He also had several unwitnessed falls at home during this time period.    Will obtain HCT r/o bleed vs intracranial process and CT A/P to r/o infection. Case d/w Dr. Fernandez & Dr. Romero (ID).       Romy Ho NP [facility]   Pager [phone]

## 2020-03-31 NOTE — PROGRESS NOTE ADULT - PROBLEM SELECTOR PLAN 1
ID input appreciated; s/p Capsofungin, will start Fluconazole tomorrow  Given need for CT Head (described below), will also obtain CT A/P

## 2020-03-31 NOTE — PHYSICAL THERAPY INITIAL EVALUATION ADULT - PERTINENT HX OF CURRENT PROBLEM, REHAB EVAL
Patient is a 84year old male admitted to [facility] for AMS, found to have COVID 19 and Candidemia. PMH listed below

## 2020-03-31 NOTE — PROGRESS NOTE ADULT - SUBJECTIVE AND OBJECTIVE BOX
S/24 Events: No acute events overnight. Oriented x 1. Remembers daughter's name.     O:  T(C): 36.6 (03-31-20 @ 12:34), Max: 36.7 (03-30-20 @ 21:15)  HR: 98 (03-31-20 @ 12:34) (81 - 98)  BP: 135/85 (03-31-20 @ 12:34) (135/85 - 150/72)  RR: 18 (03-31-20 @ 12:34) (18 - 20)  SpO2: 97% (03-31-20 @ 12:34) (95% - 97%)          O/E:  Gen: NAD  HEENT: EOMI  CV: RRR, normal S1 + S2, no m/r/g  Lungs: CTAB  Abd: soft, non-tender  Ext: No edema    Labs:                        13.4   8.09  )-----------( 363      ( 30 Mar 2020 06:11 )             41.1     03-30    144  |  106  |  25<H>  ----------------------------<  97  3.5   |  21<L>  |  0.90    Ca    9.2      30 Mar 2020 06:11  Phos  3.1     03-30  Mg     2.2     03-30    TPro  7.1  /  Alb  3.0<L>  /  TBili  0.7  /  DBili  x   /  AST  37  /  ALT  27  /  AlkPhos  58  03-30              Meds:  MEDICATIONS  (STANDING):  fluconAZOLE   Tablet 400 milliGRAM(s) Oral daily  heparin  Injectable 5000 Unit(s) SubCutaneous every 8 hours  lactobacillus acidophilus 1 Tablet(s) Oral two times a day      A/P:

## 2020-03-31 NOTE — PROGRESS NOTE ADULT - PROBLEM SELECTOR PLAN 2
Oriented x 1, overall infectious status has improved, unclear reason for AMS. Will order CT Head in light of falls PTA, TSH nl, will check B12, Folate, RPR in am

## 2020-03-31 NOTE — PROGRESS NOTE ADULT - PROBLEM SELECTOR PLAN 4
Heparin SQ,   PT evaluation noted, would not be a candidate for rehab at this time. (some component may be due to AMS)

## 2020-04-01 LAB
ALBUMIN SERPL ELPH-MCNC: 3.6 G/DL — SIGNIFICANT CHANGE UP (ref 3.3–5)
ALP SERPL-CCNC: 61 U/L — SIGNIFICANT CHANGE UP (ref 40–120)
ALT FLD-CCNC: 18 U/L — SIGNIFICANT CHANGE UP (ref 4–41)
ANION GAP SERPL CALC-SCNC: 17 MMO/L — HIGH (ref 7–14)
AST SERPL-CCNC: 33 U/L — SIGNIFICANT CHANGE UP (ref 4–40)
BILIRUB SERPL-MCNC: 0.9 MG/DL — SIGNIFICANT CHANGE UP (ref 0.2–1.2)
BUN SERPL-MCNC: 28 MG/DL — HIGH (ref 7–23)
CALCIUM SERPL-MCNC: 9.6 MG/DL — SIGNIFICANT CHANGE UP (ref 8.4–10.5)
CHLORIDE SERPL-SCNC: 108 MMOL/L — HIGH (ref 98–107)
CO2 SERPL-SCNC: 24 MMOL/L — SIGNIFICANT CHANGE UP (ref 22–31)
CREAT SERPL-MCNC: 0.85 MG/DL — SIGNIFICANT CHANGE UP (ref 0.5–1.3)
FOLATE SERPL-MCNC: 8.9 NG/ML — SIGNIFICANT CHANGE UP (ref 4.7–20)
GLUCOSE SERPL-MCNC: 99 MG/DL — SIGNIFICANT CHANGE UP (ref 70–99)
HCT VFR BLD CALC: 44.3 % — SIGNIFICANT CHANGE UP (ref 39–50)
HGB BLD-MCNC: 14.3 G/DL — SIGNIFICANT CHANGE UP (ref 13–17)
MCHC RBC-ENTMCNC: 25.7 PG — LOW (ref 27–34)
MCHC RBC-ENTMCNC: 32.3 % — SIGNIFICANT CHANGE UP (ref 32–36)
MCV RBC AUTO: 79.5 FL — LOW (ref 80–100)
NRBC # FLD: 0 K/UL — SIGNIFICANT CHANGE UP (ref 0–0)
PLATELET # BLD AUTO: 462 K/UL — HIGH (ref 150–400)
PMV BLD: 9.9 FL — SIGNIFICANT CHANGE UP (ref 7–13)
POTASSIUM SERPL-MCNC: 3.6 MMOL/L — SIGNIFICANT CHANGE UP (ref 3.5–5.3)
POTASSIUM SERPL-SCNC: 3.6 MMOL/L — SIGNIFICANT CHANGE UP (ref 3.5–5.3)
PROT SERPL-MCNC: 7.9 G/DL — SIGNIFICANT CHANGE UP (ref 6–8.3)
RBC # BLD: 5.57 M/UL — SIGNIFICANT CHANGE UP (ref 4.2–5.8)
RBC # FLD: 14.9 % — HIGH (ref 10.3–14.5)
SODIUM SERPL-SCNC: 149 MMOL/L — HIGH (ref 135–145)
VIT B12 SERPL-MCNC: 875 PG/ML — SIGNIFICANT CHANGE UP (ref 200–900)
WBC # BLD: 7.06 K/UL — SIGNIFICANT CHANGE UP (ref 3.8–10.5)
WBC # FLD AUTO: 7.06 K/UL — SIGNIFICANT CHANGE UP (ref 3.8–10.5)

## 2020-04-01 PROCEDURE — 99234 HOSP IP/OBS SM DT SF/LOW 45: CPT

## 2020-04-01 PROCEDURE — 74177 CT ABD & PELVIS W/CONTRAST: CPT | Mod: 26

## 2020-04-01 PROCEDURE — 99232 SBSQ HOSP IP/OBS MODERATE 35: CPT

## 2020-04-01 PROCEDURE — 70450 CT HEAD/BRAIN W/O DYE: CPT | Mod: 26

## 2020-04-01 RX ORDER — SODIUM CHLORIDE 9 MG/ML
1000 INJECTION, SOLUTION INTRAVENOUS
Refills: 0 | Status: DISCONTINUED | OUTPATIENT
Start: 2020-04-01 | End: 2020-04-03

## 2020-04-01 RX ORDER — FLUCONAZOLE 150 MG/1
TABLET ORAL
Refills: 0 | Status: DISCONTINUED | OUTPATIENT
Start: 2020-04-01 | End: 2020-04-05

## 2020-04-01 RX ORDER — FLUCONAZOLE 150 MG/1
400 TABLET ORAL EVERY 24 HOURS
Refills: 0 | Status: DISCONTINUED | OUTPATIENT
Start: 2020-04-02 | End: 2020-04-05

## 2020-04-01 RX ORDER — FLUCONAZOLE 150 MG/1
400 TABLET ORAL ONCE
Refills: 0 | Status: COMPLETED | OUTPATIENT
Start: 2020-04-01 | End: 2020-04-01

## 2020-04-01 RX ADMIN — SODIUM CHLORIDE 50 MILLILITER(S): 9 INJECTION, SOLUTION INTRAVENOUS at 17:01

## 2020-04-01 RX ADMIN — Medication 1 TABLET(S): at 04:12

## 2020-04-01 RX ADMIN — HEPARIN SODIUM 5000 UNIT(S): 5000 INJECTION INTRAVENOUS; SUBCUTANEOUS at 22:03

## 2020-04-01 RX ADMIN — FLUCONAZOLE 100 MILLIGRAM(S): 150 TABLET ORAL at 17:01

## 2020-04-01 RX ADMIN — Medication 1 TABLET(S): at 17:01

## 2020-04-01 RX ADMIN — HEPARIN SODIUM 5000 UNIT(S): 5000 INJECTION INTRAVENOUS; SUBCUTANEOUS at 04:12

## 2020-04-01 RX ADMIN — HEPARIN SODIUM 5000 UNIT(S): 5000 INJECTION INTRAVENOUS; SUBCUTANEOUS at 15:15

## 2020-04-01 NOTE — PROGRESS NOTE ADULT - SUBJECTIVE AND OBJECTIVE BOX
CCU Progress Note    S/24 Events: No acute events overnight.     O:  T(C): 36.8 (04-01-20 @ 20:35), Max: 36.8 (04-01-20 @ 20:35)  HR: 102 (04-01-20 @ 20:35) (96 - 102)  BP: 145/97 (04-01-20 @ 20:35) (135/84 - 145/97)  RR: 20 (04-01-20 @ 20:35) (20 - 22)  SpO2: 95% (04-01-20 @ 20:35) (93% - 95%)  Wt(kg): --  Daily     Daily     Tele:    O/E:  Gen: NAD  HEENT: EOMI  CV: RRR, normal S1 + S2, no m/r/g  Lungs: CTAB  Abd: soft, non-tender  Ext: No edema    Labs:                        14.3   7.06  )-----------( 462      ( 01 Apr 2020 06:00 )             44.3     04-01    149<H>  |  108<H>  |  28<H>  ----------------------------<  99  3.6   |  24  |  0.85    Ca    9.6      01 Apr 2020 06:00    TPro  7.9  /  Alb  3.6  /  TBili  0.9  /  DBili  x   /  AST  33  /  ALT  18  /  AlkPhos  61  04-01              Meds:  MEDICATIONS  (STANDING):  dextrose 5%. 1000 milliLiter(s) (50 mL/Hr) IV Continuous <Continuous>  fluconAZOLE IVPB      heparin  Injectable 5000 Unit(s) SubCutaneous every 8 hours  lactobacillus acidophilus 1 Tablet(s) Oral two times a day

## 2020-04-01 NOTE — PROGRESS NOTE ADULT - PROBLEM SELECTOR PLAN 2
Non verbal, yesterday able to tell me his daughter's name. Likely delerious. No focal findings on CT Head. Fungemia and COVID infection are likely cause of AMS. TSH, B12, Folate wnl

## 2020-04-01 NOTE — PROGRESS NOTE ADULT - PROBLEM SELECTOR PLAN 1
ID input appreciated; s/p Capsofungin, Fluconazole IV, patient not swallowing today  CT A/P prelim report with no acute findings

## 2020-04-01 NOTE — PROGRESS NOTE ADULT - SUBJECTIVE AND OBJECTIVE BOX
Follow Up:      Inverval History/ROS:Patient is a 84y old  Male who presents with a chief complaint of fevers, AMS (31 Mar 2020 17:20)    No fever  Maintaining saturation on room air.      Allergies    No Known Allergies    Intolerances        ANTIMICROBIALS:  fluconAZOLE IVPB        OTHER MEDS:  acetaminophen   Tablet .. 650 milliGRAM(s) Oral every 6 hours PRN  dextrose 5%. 1000 milliLiter(s) IV Continuous <Continuous>  heparin  Injectable 5000 Unit(s) SubCutaneous every 8 hours  lactobacillus acidophilus 1 Tablet(s) Oral two times a day      Vital Signs Last 24 Hrs  T(C): 36.3 (01 Apr 2020 17:20), Max: 36.8 (31 Mar 2020 20:55)  T(F): 97.3 (01 Apr 2020 17:20), Max: 98.2 (31 Mar 2020 20:55)  HR: 100 (01 Apr 2020 17:20) (96 - 100)  BP: 135/84 (01 Apr 2020 17:20) (135/73 - 140/90)  BP(mean): --  RR: 22 (01 Apr 2020 17:20) (19 - 22)  SpO2: 93% (01 Apr 2020 17:20) (90% - 94%)                                  14.3   7.06  )-----------( 462      ( 01 Apr 2020 06:00 )             44.3       04-01    149<H>  |  108<H>  |  28<H>  ----------------------------<  99  3.6   |  24  |  0.85    Ca    9.6      01 Apr 2020 06:00    TPro  7.9  /  Alb  3.6  /  TBili  0.9  /  DBili  x   /  AST  33  /  ALT  18  /  AlkPhos  61  04-01          MICROBIOLOGY:    RADIOLOGY:

## 2020-04-02 LAB
ALBUMIN SERPL ELPH-MCNC: 3 G/DL — LOW (ref 3.3–5)
ALP SERPL-CCNC: 58 U/L — SIGNIFICANT CHANGE UP (ref 40–120)
ALT FLD-CCNC: 15 U/L — SIGNIFICANT CHANGE UP (ref 4–41)
ANION GAP SERPL CALC-SCNC: 16 MMO/L — HIGH (ref 7–14)
AST SERPL-CCNC: 27 U/L — SIGNIFICANT CHANGE UP (ref 4–40)
BILIRUB SERPL-MCNC: 0.8 MG/DL — SIGNIFICANT CHANGE UP (ref 0.2–1.2)
BUN SERPL-MCNC: 31 MG/DL — HIGH (ref 7–23)
CALCIUM SERPL-MCNC: 9.5 MG/DL — SIGNIFICANT CHANGE UP (ref 8.4–10.5)
CHLORIDE SERPL-SCNC: 109 MMOL/L — HIGH (ref 98–107)
CO2 SERPL-SCNC: 23 MMOL/L — SIGNIFICANT CHANGE UP (ref 22–31)
CREAT SERPL-MCNC: 0.86 MG/DL — SIGNIFICANT CHANGE UP (ref 0.5–1.3)
GLUCOSE SERPL-MCNC: 101 MG/DL — HIGH (ref 70–99)
HCT VFR BLD CALC: 44 % — SIGNIFICANT CHANGE UP (ref 39–50)
HGB BLD-MCNC: 14.2 G/DL — SIGNIFICANT CHANGE UP (ref 13–17)
MAGNESIUM SERPL-MCNC: 2.4 MG/DL — SIGNIFICANT CHANGE UP (ref 1.6–2.6)
MCHC RBC-ENTMCNC: 26.2 PG — LOW (ref 27–34)
MCHC RBC-ENTMCNC: 32.3 % — SIGNIFICANT CHANGE UP (ref 32–36)
MCV RBC AUTO: 81.2 FL — SIGNIFICANT CHANGE UP (ref 80–100)
NRBC # FLD: 0 K/UL — SIGNIFICANT CHANGE UP (ref 0–0)
PHOSPHATE SERPL-MCNC: 3.3 MG/DL — SIGNIFICANT CHANGE UP (ref 2.5–4.5)
PLATELET # BLD AUTO: 454 K/UL — HIGH (ref 150–400)
PMV BLD: 10.3 FL — SIGNIFICANT CHANGE UP (ref 7–13)
POTASSIUM SERPL-MCNC: 3.5 MMOL/L — SIGNIFICANT CHANGE UP (ref 3.5–5.3)
POTASSIUM SERPL-SCNC: 3.5 MMOL/L — SIGNIFICANT CHANGE UP (ref 3.5–5.3)
PROT SERPL-MCNC: 7.6 G/DL — SIGNIFICANT CHANGE UP (ref 6–8.3)
RBC # BLD: 5.42 M/UL — SIGNIFICANT CHANGE UP (ref 4.2–5.8)
RBC # FLD: 15.2 % — HIGH (ref 10.3–14.5)
SODIUM SERPL-SCNC: 148 MMOL/L — HIGH (ref 135–145)
WBC # BLD: 6.48 K/UL — SIGNIFICANT CHANGE UP (ref 3.8–10.5)
WBC # FLD AUTO: 6.48 K/UL — SIGNIFICANT CHANGE UP (ref 3.8–10.5)

## 2020-04-02 PROCEDURE — 99234 HOSP IP/OBS SM DT SF/LOW 45: CPT

## 2020-04-02 RX ORDER — NYSTATIN 500MM UNIT
500000 POWDER (EA) MISCELLANEOUS
Refills: 0 | Status: DISCONTINUED | OUTPATIENT
Start: 2020-04-02 | End: 2020-04-05

## 2020-04-02 RX ORDER — SODIUM CHLORIDE 9 MG/ML
1000 INJECTION, SOLUTION INTRAVENOUS
Refills: 0 | Status: DISCONTINUED | OUTPATIENT
Start: 2020-04-02 | End: 2020-04-05

## 2020-04-02 RX ADMIN — Medication 1 TABLET(S): at 04:30

## 2020-04-02 RX ADMIN — Medication 500000 UNIT(S): at 17:11

## 2020-04-02 RX ADMIN — FLUCONAZOLE 100 MILLIGRAM(S): 150 TABLET ORAL at 14:22

## 2020-04-02 RX ADMIN — HEPARIN SODIUM 5000 UNIT(S): 5000 INJECTION INTRAVENOUS; SUBCUTANEOUS at 04:31

## 2020-04-02 RX ADMIN — SODIUM CHLORIDE 50 MILLILITER(S): 9 INJECTION, SOLUTION INTRAVENOUS at 19:02

## 2020-04-02 RX ADMIN — Medication 500000 UNIT(S): at 22:33

## 2020-04-02 RX ADMIN — HEPARIN SODIUM 5000 UNIT(S): 5000 INJECTION INTRAVENOUS; SUBCUTANEOUS at 22:32

## 2020-04-02 NOTE — PROGRESS NOTE ADULT - SUBJECTIVE AND OBJECTIVE BOX
S/24 Events: No acute events overnight.     O:  T(C): 36.2 (04-02-20 @ 20:54), Max: 36.7 (04-02-20 @ 04:29)  HR: 84 (04-02-20 @ 20:54) (84 - 103)  BP: 133/86 (04-02-20 @ 20:54) (133/86 - 153/90)  RR: 18 (04-02-20 @ 20:54) (16 - 20)  SpO2: 97% (04-02-20 @ 20:54) (97% - 100%)    O/E: exam deferred    Labs:                        14.2   6.48  )-----------( 454      ( 02 Apr 2020 06:32 )             44.0     04-02    148<H>  |  109<H>  |  31<H>  ----------------------------<  101<H>  3.5   |  23  |  0.86    Ca    9.5      02 Apr 2020 06:32  Phos  3.3     04-02  Mg     2.4     04-02    TPro  7.6  /  Alb  3.0<L>  /  TBili  0.8  /  DBili  x   /  AST  27  /  ALT  15  /  AlkPhos  58  04-02              Meds:  MEDICATIONS  (STANDING):  dextrose 5%. 1000 milliLiter(s) (50 mL/Hr) IV Continuous <Continuous>  dextrose 5%. 1000 milliLiter(s) (50 mL/Hr) IV Continuous <Continuous>  fluconAZOLE IVPB      fluconAZOLE IVPB 400 milliGRAM(s) IV Intermittent every 24 hours  heparin  Injectable 5000 Unit(s) SubCutaneous every 8 hours  lactobacillus acidophilus 1 Tablet(s) Oral two times a day  nystatin    Suspension 216589 Unit(s) Swish and Swallow four times a day      A/P:

## 2020-04-02 NOTE — DIETITIAN INITIAL EVALUATION ADULT. - PERTINENT LABORATORY DATA
04-02 Na148 mmol/L<H> Glu 101 mg/dL<H> K+ 3.5 mmol/L Cr  0.86 mg/dL BUN 31 mg/dL<H> 04-02 Phos 3.3 mg/dL 04-02 Alb 3.0 g/dL<L>

## 2020-04-02 NOTE — DIETITIAN INITIAL EVALUATION ADULT. - ADD RECOMMEND
1- Speech and swallow eval as feasible 2- Monitor weights, labs, BM's, skin integrity, p.o. intake. 3- Please Encourage po intake, assist with meals and menu selections, provide alternatives PRN. 4- RD remains available, re-consult as needed.

## 2020-04-02 NOTE — PROGRESS NOTE ADULT - PROBLEM SELECTOR PLAN 2
Likely delerium. No focal findings on CT Head. Fungemia and COVID infection are likely cause of AMS. TSH, B12, Folate wnl

## 2020-04-02 NOTE — DIETITIAN INITIAL EVALUATION ADULT. - OTHER INFO
Initial Dietitian Evaluation 2/2 to extended length of stay. Patient is a 85 year old male with Hx prostate cancer who presented with change in MS, fever, COVID 19 +. Unable to conduct a face to face interview or nutrition-focused physical exam due to limited contact restrictions related to patient's medical condition and isolation precautions. Obtained subjective information from extensive chart review. Furthermore, patient confused and lethargic at this time.  Currently ordered for a regular diet + Ensure Enlive 240mls 3x daily (1050kcal, 60g protein) in light of poor PO intake. Noted, patient with difficulty swallowing pills, would suggest speech and swallow evaluation to determine safest consistency of foods/fluids. Admission wt 54.1 kgs 3/23, no current wt available. Wt obtained from a prior admission 59 kgs 5/23/19, suggestive of 8.5% wt loss in ~ 10 months. Additional wt loss suspected.    Please continue to encourage PO and supplement intake as patient able.

## 2020-04-02 NOTE — DIETITIAN INITIAL EVALUATION ADULT. - PROBLEM SELECTOR PLAN 8
- hyponatremia to 131 in the setting of poor po intake vs PNA  - continue to monitor  - if downtrending, consider urine studies  - IVF 50cc/hr for 10 hours

## 2020-04-02 NOTE — DIETITIAN INITIAL EVALUATION ADULT. - PROBLEM SELECTOR PLAN 3
- patient with 7% bandemia with lymphopenia concerning for infection  - f/u COVID-19 PCR, blood cultures, UA  - lymphopenia possibly 2/2 COVID  - continue to trend

## 2020-04-02 NOTE — DIETITIAN INITIAL EVALUATION ADULT. - PERTINENT MEDS FT
MEDICATIONS  (STANDING):  dextrose 5%. 1000 milliLiter(s) (50 mL/Hr) IV Continuous <Continuous>  fluconAZOLE IVPB      fluconAZOLE IVPB 400 milliGRAM(s) IV Intermittent every 24 hours  heparin  Injectable 5000 Unit(s) SubCutaneous every 8 hours  lactobacillus acidophilus 1 Tablet(s) Oral two times a day

## 2020-04-02 NOTE — DIETITIAN INITIAL EVALUATION ADULT. - PROBLEM SELECTOR PLAN 6
- patient with microcytic anemia possibly iron deficiency vs anemia of chronic disease. No concern for acute bleed.  - f/u iron studies  - continue to trend

## 2020-04-02 NOTE — DIETITIAN INITIAL EVALUATION ADULT. - PROBLEM SELECTOR PLAN 1
- patient with CXR showing b/l lung opacities concerning for PNA, possibly bacterial given 7% bandemia, however could be viral 2/2 COVID-19  - s/p ceftriaxone and azithro for CAP, f/u urine legionella. Hold off on abx until blood cultures.   - if COVID-19 positive, PNA can be 2/2 to COVID-19  - currently off supplemental O2, can use nasal cannula or nonrebreather. No high-flow or BIPAP given COVID rule out if needed. Low threshold to consult MICU if in respiratory distress

## 2020-04-02 NOTE — PROGRESS NOTE ADULT - PROBLEM SELECTOR PLAN 1
ID input appreciated; s/p Capsofungin, Fluconazole IV,   CT A/P prelim report with no acute findings  Started Nystatin for thrush.

## 2020-04-03 LAB
ALBUMIN SERPL ELPH-MCNC: 3.2 G/DL — LOW (ref 3.3–5)
ALP SERPL-CCNC: 56 U/L — SIGNIFICANT CHANGE UP (ref 40–120)
ALT FLD-CCNC: 13 U/L — SIGNIFICANT CHANGE UP (ref 4–41)
ANION GAP SERPL CALC-SCNC: 15 MMO/L — HIGH (ref 7–14)
AST SERPL-CCNC: 27 U/L — SIGNIFICANT CHANGE UP (ref 4–40)
BILIRUB SERPL-MCNC: 0.8 MG/DL — SIGNIFICANT CHANGE UP (ref 0.2–1.2)
BUN SERPL-MCNC: 29 MG/DL — HIGH (ref 7–23)
CALCIUM SERPL-MCNC: 9.1 MG/DL — SIGNIFICANT CHANGE UP (ref 8.4–10.5)
CHLORIDE SERPL-SCNC: 108 MMOL/L — HIGH (ref 98–107)
CO2 SERPL-SCNC: 24 MMOL/L — SIGNIFICANT CHANGE UP (ref 22–31)
CREAT SERPL-MCNC: 0.96 MG/DL — SIGNIFICANT CHANGE UP (ref 0.5–1.3)
CRP SERPL-MCNC: 72.4 MG/L — HIGH
FERRITIN SERPL-MCNC: 1356 NG/ML — HIGH (ref 30–400)
GLUCOSE SERPL-MCNC: 98 MG/DL — SIGNIFICANT CHANGE UP (ref 70–99)
HCT VFR BLD CALC: 41.4 % — SIGNIFICANT CHANGE UP (ref 39–50)
HGB BLD-MCNC: 13.5 G/DL — SIGNIFICANT CHANGE UP (ref 13–17)
MAGNESIUM SERPL-MCNC: 2.4 MG/DL — SIGNIFICANT CHANGE UP (ref 1.6–2.6)
MCHC RBC-ENTMCNC: 26.6 PG — LOW (ref 27–34)
MCHC RBC-ENTMCNC: 32.6 % — SIGNIFICANT CHANGE UP (ref 32–36)
MCV RBC AUTO: 81.5 FL — SIGNIFICANT CHANGE UP (ref 80–100)
NRBC # FLD: 0 K/UL — SIGNIFICANT CHANGE UP (ref 0–0)
PHOSPHATE SERPL-MCNC: 3.5 MG/DL — SIGNIFICANT CHANGE UP (ref 2.5–4.5)
PLATELET # BLD AUTO: 480 K/UL — HIGH (ref 150–400)
PMV BLD: 10 FL — SIGNIFICANT CHANGE UP (ref 7–13)
POTASSIUM SERPL-MCNC: 3.9 MMOL/L — SIGNIFICANT CHANGE UP (ref 3.5–5.3)
POTASSIUM SERPL-SCNC: 3.9 MMOL/L — SIGNIFICANT CHANGE UP (ref 3.5–5.3)
PROCALCITONIN SERPL-MCNC: 0.08 NG/ML — SIGNIFICANT CHANGE UP (ref 0.02–0.1)
PROT SERPL-MCNC: 6.7 G/DL — SIGNIFICANT CHANGE UP (ref 6–8.3)
RBC # BLD: 5.08 M/UL — SIGNIFICANT CHANGE UP (ref 4.2–5.8)
RBC # FLD: 15.2 % — HIGH (ref 10.3–14.5)
SODIUM SERPL-SCNC: 147 MMOL/L — HIGH (ref 135–145)
WBC # BLD: 4.94 K/UL — SIGNIFICANT CHANGE UP (ref 3.8–10.5)
WBC # FLD AUTO: 4.94 K/UL — SIGNIFICANT CHANGE UP (ref 3.8–10.5)

## 2020-04-03 RX ORDER — SODIUM CHLORIDE 9 MG/ML
1000 INJECTION, SOLUTION INTRAVENOUS
Refills: 0 | Status: COMPLETED | OUTPATIENT
Start: 2020-04-03 | End: 2020-04-03

## 2020-04-03 RX ADMIN — Medication 500000 UNIT(S): at 23:51

## 2020-04-03 RX ADMIN — SODIUM CHLORIDE 50 MILLILITER(S): 9 INJECTION, SOLUTION INTRAVENOUS at 16:58

## 2020-04-03 RX ADMIN — HEPARIN SODIUM 5000 UNIT(S): 5000 INJECTION INTRAVENOUS; SUBCUTANEOUS at 05:07

## 2020-04-03 RX ADMIN — HEPARIN SODIUM 5000 UNIT(S): 5000 INJECTION INTRAVENOUS; SUBCUTANEOUS at 14:39

## 2020-04-03 RX ADMIN — Medication 500000 UNIT(S): at 11:45

## 2020-04-03 RX ADMIN — HEPARIN SODIUM 5000 UNIT(S): 5000 INJECTION INTRAVENOUS; SUBCUTANEOUS at 23:54

## 2020-04-03 RX ADMIN — Medication 500000 UNIT(S): at 05:07

## 2020-04-03 RX ADMIN — Medication 1 TABLET(S): at 17:10

## 2020-04-03 RX ADMIN — Medication 500000 UNIT(S): at 17:10

## 2020-04-03 RX ADMIN — FLUCONAZOLE 100 MILLIGRAM(S): 150 TABLET ORAL at 14:39

## 2020-04-03 NOTE — PROGRESS NOTE ADULT - ASSESSMENT
84 yo M w/ PMHx prostate CA on lupron, GERD BIBEMS from home with fevers, found to have COVID19 as well asc
84 yo M with Prostate Cancer on Lupron, GERD with COVID 19
85 M with PMH of prostate CA on lupron, GERD, presenting from home with fevers, diarrhea, altered mental status found to have b/l opacities on CXR concerning for PNA,  COVID-19 positive
85 M with PMH of prostate CA on lupron, GERD, presenting from home with fevers, diarrhea, altered mental status found to have b/l opacities on CXR concerning for PNA, pending COVID-19 rule out.
85 M with PMH of prostate CA on lupron, GERD, presenting from home with fevers, diarrhea, altered mental status found to have b/l opacities on CXR concerning for PNA, pending COVID-19 rule out.
85 year old with prostate cancer presents with change in MS with fever    1) COVID+   supportive care  Airborne/ contact    Given his advanced age and comorbidity, he is at high risk for complication from COVID.  There is anecdotal reports of improvement with hydroxychloroquine.  I think given that his risk of mortality is probably over 20 percent, that a trial of hydroxychloroquine is appropriate.      2) Candidemia:  ?  focus    Check UA and Urine CX    Repeat blood Cx times two without growth to date    Eventually will need CT a/p , echo, opthalmology- but can hold off until off isolation    Change to caspofungin (start on 3/25)  Will change to fluconazole after hydroxychloroquine course completed
85 year old with prostate cancer presents with change in MS with fever    1) COVID+   supportive care  Airborne/ contact    Given his advanced age and comorbidity, he is at high risk for complication from COVID.  There is anecdotal reports of improvement with hydroxychloroquine.  I think given that his risk of mortality is probably over 20 percent, that a trial of hydroxychloroquine is appropriate.      Finish hydroxychloroquine on 3/30    2) Candidemia:  ?  focus  Continue casponfungin through 3/30    On 4/1- can change to Fluconazole 400 mg po daily through 4/22/2020.  Eventually will need CT a/p , echo, opthalmology- but can hold off until off isolation
85 year old with prostate cancer presents with change in MS with fever    1) COVID+   supportive care  Airborne/ contact    Given his advanced age and comorbidity, he is at high risk for complication from COVID.  There is anecdotal reports of improvement with hydroxychloroquine.  I think given that his risk of mortality is probably over 20 percent, that a trial of hydroxychloroquine is appropriate.      Finished plaquenel    2) Candidemia:  ?  focus  Fluconazole 400 mg po daily through 4/22/2020.  (taking IV at this time as pt sometimes refuses po- but transition to po is okay)   CT a/p when feasible  echo, opthalmology- but can hold off until off isolation
85 year old with prostate cancer presents with change in MS with fever    1) COVID+   supportive care  Airborne/ contact    Given his advanced age and comorbidity, he is at high risk for complication from COVID.  There is anecdotal reports of improvement with hydroxychloroquine.  I think given that his risk of mortality is probably over 20 percent, that a trial of hydroxychloroquine is appropriate.      Finished plaquenel    2) Candidemia:  ?  focus  Fluconazole 400 mg po daily through 4/22/2020.  CT a/p when feasible  echo, opthalmology- but can hold off until off isolation
86 yo M with Prostate Cancer on Lupron, GERD with COVID 19
85 M with PMH of prostate CA on lupron, GERD, presenting from home with fevers, diarrhea, altered mental status found to have b/l opacities on CXR concerning for PNA, pending COVID-19 rule out.

## 2020-04-03 NOTE — CHART NOTE - NSCHARTNOTEFT_GEN_A_CORE
D/w NP on floor. Patient is medically stable for d/c but needs placement in rehab for IV fluc for candidemia as oral route unreliable.  Na trending down will continue D5 for 10 more hr  encourage PO intake.  Minimal COVID symptoms.

## 2020-04-03 NOTE — PROGRESS NOTE ADULT - PROBLEM SELECTOR PROBLEM 4
Preventive measure
Tracheal deviation
Preventive measure
Preventive measure
Tracheal deviation

## 2020-04-03 NOTE — PROGRESS NOTE ADULT - PROBLEM SELECTOR PROBLEM 3
Pneumonia
Bandemia
Pneumonia
Bandemia
Pneumonia

## 2020-04-03 NOTE — PROGRESS NOTE ADULT - PROBLEM SELECTOR PLAN 2
Likely delerium. No focal findings on CT Head. Fungemia and COVID infection are likely cause of AMS. TSH, B12, Folate wnl Likely delerium. No focal findings on CT Head. Fungemia and COVID infection are likely cause of AMS. TSH, B12, Folate wnl. Mild HyperNA, will give low amount of D5.

## 2020-04-03 NOTE — PROGRESS NOTE ADULT - PROBLEM SELECTOR PLAN 1
ID input appreciated; s/p Capsofungin, on Fluconazole IV,   CT A/P prelim report with no acute findings  Started Nystatin for thrush. ID input appreciated; s/p Capsofungin, on Fluconazole IV,   CT A/P prelim report with no acute findings  Started Nystatin for thrush.  Overall dispo is to rehab for IV fluconazole as pt has unreliable oral intake.

## 2020-04-03 NOTE — PROGRESS NOTE ADULT - PROBLEM SELECTOR PROBLEM 2
Metabolic encephalopathy

## 2020-04-03 NOTE — PROGRESS NOTE ADULT - SUBJECTIVE AND OBJECTIVE BOX
Patient is a 84y old  Male who presents with a chief complaint of fevers, AMS (02 Apr 2020 22:51)      SUBJECTIVE / OVERNIGHT EVENTS: No acute events overnight.    MEDICATIONS  (STANDING):  dextrose 5%. 1000 milliLiter(s) (50 mL/Hr) IV Continuous <Continuous>  dextrose 5%. 1000 milliLiter(s) (50 mL/Hr) IV Continuous <Continuous>  fluconAZOLE IVPB      fluconAZOLE IVPB 400 milliGRAM(s) IV Intermittent every 24 hours  heparin  Injectable 5000 Unit(s) SubCutaneous every 8 hours  lactobacillus acidophilus 1 Tablet(s) Oral two times a day  nystatin    Suspension 934560 Unit(s) Swish and Swallow four times a day    MEDICATIONS  (PRN):  acetaminophen   Tablet .. 650 milliGRAM(s) Oral every 6 hours PRN Temp greater or equal to 38C (100.4F)      T(C): 36.9 (04-03-20 @ 11:43), Max: 36.9 (04-03-20 @ 11:43)  HR: 102 (04-03-20 @ 11:43) (84 - 102)  BP: 128/83 (04-03-20 @ 11:43) (128/83 - 153/90)  RR: 16 (04-03-20 @ 11:43) (16 - 20)  SpO2: 97% (04-03-20 @ 11:43) (97% - 100%)  CAPILLARY BLOOD GLUCOSE        I&O's Summary      PHYSICAL EXAM:  GENERAL: not in distress, on room air  EYES: open, sclera clear b/l  CHEST/LUNG: normal respiratory effort, not tachypneic, speaking in full sentences without difficulty  HEART: Not tachycardic, no lower extremity edema b/l  ABDOMEN: Soft, Nontender, Nondistended  EXTREMITIES: Warm and well perfused  NEUROLOGY: somewhat confused  PSYCH: calm, cooperative  SKIN: No visible rashes or lesions    LABS:                        13.5   4.94  )-----------( 480      ( 03 Apr 2020 06:38 )             41.4     04-03    147<H>  |  108<H>  |  29<H>  ----------------------------<  98  3.9   |  24  |  0.96    Ca    9.1      03 Apr 2020 06:38  Phos  3.5     04-03  Mg     2.4     04-03    TPro  6.7  /  Alb  3.2<L>  /  TBili  0.8  /  DBili  x   /  AST  27  /  ALT  13  /  AlkPhos  56  04-03              RADIOLOGY & ADDITIONAL TESTS:

## 2020-04-03 NOTE — PROGRESS NOTE ADULT - PROBLEM SELECTOR PROBLEM 1
Candidemia
Pneumonia
Candidemia
Candidemia
Pneumonia

## 2020-04-04 PROCEDURE — 99232 SBSQ HOSP IP/OBS MODERATE 35: CPT

## 2020-04-04 RX ADMIN — HEPARIN SODIUM 5000 UNIT(S): 5000 INJECTION INTRAVENOUS; SUBCUTANEOUS at 21:16

## 2020-04-04 RX ADMIN — Medication 500000 UNIT(S): at 12:26

## 2020-04-04 RX ADMIN — FLUCONAZOLE 100 MILLIGRAM(S): 150 TABLET ORAL at 18:46

## 2020-04-04 RX ADMIN — Medication 500000 UNIT(S): at 22:51

## 2020-04-04 RX ADMIN — Medication 1 TABLET(S): at 04:35

## 2020-04-04 RX ADMIN — Medication 1 TABLET(S): at 18:52

## 2020-04-04 RX ADMIN — Medication 500000 UNIT(S): at 04:35

## 2020-04-04 RX ADMIN — HEPARIN SODIUM 5000 UNIT(S): 5000 INJECTION INTRAVENOUS; SUBCUTANEOUS at 18:45

## 2020-04-04 RX ADMIN — HEPARIN SODIUM 5000 UNIT(S): 5000 INJECTION INTRAVENOUS; SUBCUTANEOUS at 04:35

## 2020-04-04 RX ADMIN — Medication 500000 UNIT(S): at 18:45

## 2020-04-04 NOTE — PROGRESS NOTE ADULT - SUBJECTIVE AND OBJECTIVE BOX
Hospitalist Attending Progress Note    CHIEF COMPLAINT/REASON FOR CONSULT: COVID, ALEKSANDR    HISTORY OF PRESENT ILLNESS:    84y.o. Male with Prostate CA on Lupron, GERD, Candidemia, now with COVID-19.    INTERVAL EVENTS:   Patient seen and examined. Overall he states that he feels well. No Chest Pain. No SOB. No HA/Dizziness. No Palpitations. No N/V/D/F/C.    Allergies    No Known Allergies    Intolerances    	    MEDICATIONS:  heparin  Injectable 5000 Unit(s) SubCutaneous every 8 hours    fluconAZOLE IVPB      fluconAZOLE IVPB 400 milliGRAM(s) IV Intermittent every 24 hours  nystatin    Suspension 991336 Unit(s) Swish and Swallow four times a day      acetaminophen   Tablet .. 650 milliGRAM(s) Oral every 6 hours PRN        dextrose 5%. 1000 milliLiter(s) IV Continuous <Continuous>      PAST MEDICAL & SURGICAL HISTORY:  Prostate cancer  No significant past surgical history      FAMILY HISTORY:  No hx of MI/CVA    SOCIAL HISTORY:    Never smoked, no ETOH, no IVDU    REVIEW OF SYSTEMS:    CONSTITUTIONAL: No weakness, fevers or chills  EYES/ENT: No visual changes;  No vertigo or throat pain   NECK: No pain or stiffness  RESPIRATORY: No cough, wheezing, hemoptysis; No shortness of breath  CARDIOVASCULAR: No chest pain or palpitations  GASTROINTESTINAL: No abdominal or epigastric pain. No nausea, vomiting, or hematemesis; No diarrhea or constipation. No melena or hematochezia.  GENITOURINARY: No dysuria, frequency or hematuria  NEUROLOGICAL: No numbness or weakness  SKIN: No itching, burning, rashes, or lesions   All other review of systems is negative unless indicated above.    PHYSICAL EXAM:  T(C): 36.1 (04-04-20 @ 12:27), Max: 36.5 (04-03-20 @ 20:52)  HR: 77 (04-04-20 @ 12:27) (71 - 82)  BP: 132/95 (04-04-20 @ 12:27) (132/95 - 144/83)  RR: 18 (04-04-20 @ 12:27) (18 - 18)  SpO2: 99% (04-04-20 @ 12:27) (99% - 100%)  Wt(kg): --  I&O's Summary      Appearance: Normal	  HEENT:   Normal oral mucosa, PERRL, EOMI	  Lymphatic: No lymphadenopathy  Cardiovascular: Normal S1 S2, No JVD, No murmurs, No edema  Respiratory: Lungs clear to auscultation	  Psychiatry: A & O x 3, Mood & affect appropriate  Gastrointestinal:  Soft, Non-tender, + BS	  Skin: No rashes, No ecchymoses, No cyanosis	  Neurologic: Non-focal  Extremities: Normal range of motion, No clubbing, cyanosis or edema  Vascular: Peripheral pulses palpable 2+ bilaterally    LABS:	 	    CBC Full  -  ( 03 Apr 2020 06:38 )  WBC Count : 4.94 K/uL  Hemoglobin : 13.5 g/dL  Hematocrit : 41.4 %  Platelet Count - Automated : 480 K/uL  Mean Cell Volume : 81.5 fL  Mean Cell Hemoglobin : 26.6 pg  Mean Cell Hemoglobin Concentration : 32.6 %  Auto Neutrophil # : x  Auto Lymphocyte # : x  Auto Monocyte # : x  Auto Eosinophil # : x  Auto Basophil # : x  Auto Neutrophil % : x  Auto Lymphocyte % : x  Auto Monocyte % : x  Auto Eosinophil % : x  Auto Basophil % : x    04-03    147<H>  |  108<H>  |  29<H>  ----------------------------<  98  3.9   |  24  |  0.96    Ca    9.1      03 Apr 2020 06:38  Phos  3.5     04-03  Mg     2.4     04-03    TPro  6.7  /  Alb  3.2<L>  /  TBili  0.8  /  DBili  x   /  AST  27  /  ALT  13  /  AlkPhos  56  04-03      A/P: 84y.o. Male with Prostate CA on Lupron, GERD, Candidemia, now with COVID-19.    1. COVID - 19 - Patient without respiratory complaints. No SOB. no cough. No fever.    2. Candidemia - Appreciate ID reccomendations. s/p Caspofungin,   -Cont Fluconazole  mg Q24  -On Nystatin for thrush.  -Plan for DC to Rehab with IV Fluconazole vs home with PO Fluconazole if we can ensure that his family will monitor that he takes the medication as prescribed. Most recent PT eval (03/31) noted patient not a candidate for Rehab as unable to follow commands at that time.     3. Metabolic encephalopathy - Likely delirium 2/2 recent infection (fungemia vs COVID) CT-Head negative.  - TSH, B12, Folate normal  -NA improved 147. Cont IVF, encourage PO hydration.     PPX - [facility]    Desean Koehler MD  Hospitalist Surge Attending  [facility] / [facility]  Cell: [phone]

## 2020-04-05 ENCOUNTER — TRANSCRIPTION ENCOUNTER (OUTPATIENT)
Age: 85
End: 2020-04-05

## 2020-04-05 VITALS — OXYGEN SATURATION: 98 % | RESPIRATION RATE: 26 BRPM

## 2020-04-05 PROCEDURE — 99232 SBSQ HOSP IP/OBS MODERATE 35: CPT

## 2020-04-05 RX ORDER — LACTOBACILLUS ACIDOPHILUS 100MM CELL
1 CAPSULE ORAL
Qty: 40 | Refills: 0
Start: 2020-04-05 | End: 2020-04-24

## 2020-04-05 RX ORDER — FLUCONAZOLE 150 MG/1
2 TABLET ORAL
Qty: 34 | Refills: 0
Start: 2020-04-05 | End: 2020-04-21

## 2020-04-05 RX ORDER — FLUCONAZOLE 150 MG/1
400 TABLET ORAL DAILY
Refills: 0 | Status: DISCONTINUED | OUTPATIENT
Start: 2020-04-05 | End: 2020-04-05

## 2020-04-05 RX ADMIN — Medication 500000 UNIT(S): at 17:07

## 2020-04-05 RX ADMIN — Medication 1 TABLET(S): at 05:00

## 2020-04-05 RX ADMIN — FLUCONAZOLE 400 MILLIGRAM(S): 150 TABLET ORAL at 17:06

## 2020-04-05 RX ADMIN — Medication 1 TABLET(S): at 17:06

## 2020-04-05 RX ADMIN — HEPARIN SODIUM 5000 UNIT(S): 5000 INJECTION INTRAVENOUS; SUBCUTANEOUS at 12:23

## 2020-04-05 RX ADMIN — HEPARIN SODIUM 5000 UNIT(S): 5000 INJECTION INTRAVENOUS; SUBCUTANEOUS at 05:00

## 2020-04-05 RX ADMIN — Medication 500000 UNIT(S): at 12:23

## 2020-04-05 RX ADMIN — Medication 500000 UNIT(S): at 05:00

## 2020-04-05 NOTE — DISCHARGE NOTE NURSING/CASE MANAGEMENT/SOCIAL WORK - PATIENT PORTAL LINK FT
You can access the FollowMyHealth Patient Portal offered by [facility] by registering at the following website: [email or web address]. By joining [employer]’s FollowMyHealth portal, you will also be able to view your health information using other applications (apps) compatible with our system.

## 2020-04-05 NOTE — PROGRESS NOTE ADULT - REASON FOR ADMISSION
fevers, AMS

## 2020-04-05 NOTE — PROGRESS NOTE ADULT - SUBJECTIVE AND OBJECTIVE BOX
Hospitalist Attending Progress Note    CHIEF COMPLAINT/REASON FOR CONSULT: COVID, ALEKSANDR    HISTORY OF PRESENT ILLNESS:    84y.o. Male with Prostate CA on Lupron, GERD, Candidemia, now with COVID-19.    INTERVAL EVENTS:   Patient seen and examined. Overall he states that he feels well. No Chest Pain. No SOB. No HA/Dizziness. No Palpitations. No N/V/D/F/C.    Allergies    No Known Allergies    Intolerances    	    MEDICATIONS:  heparin  Injectable 5000 Unit(s) SubCutaneous every 8 hours    fluconAZOLE IVPB      fluconAZOLE IVPB 400 milliGRAM(s) IV Intermittent every 24 hours  nystatin    Suspension 141790 Unit(s) Swish and Swallow four times a day      acetaminophen   Tablet .. 650 milliGRAM(s) Oral every 6 hours PRN        dextrose 5%. 1000 milliLiter(s) IV Continuous <Continuous>      PAST MEDICAL & SURGICAL HISTORY:  Prostate cancer  No significant past surgical history      FAMILY HISTORY:      SOCIAL HISTORY:    Never smoked, no ETOH, no IVDU    REVIEW OF SYSTEMS:    CONSTITUTIONAL: No weakness, fevers or chills  EYES/ENT: No visual changes;  No vertigo or throat pain   NECK: No pain or stiffness  RESPIRATORY: No cough, wheezing, hemoptysis; No shortness of breath  CARDIOVASCULAR: No chest pain or palpitations  GASTROINTESTINAL: No abdominal or epigastric pain. No nausea, vomiting, or hematemesis; No diarrhea or constipation. No melena or hematochezia.  GENITOURINARY: No dysuria, frequency or hematuria  NEUROLOGICAL: No numbness or weakness  SKIN: No itching, burning, rashes, or lesions   All other review of systems is negative unless indicated above.    PHYSICAL EXAM:  T(C): 36.6 (04-05-20 @ 09:57), Max: 36.9 (04-04-20 @ 20:53)  HR: 75 (04-05-20 @ 09:57) (75 - 79)  BP: 145/93 (04-05-20 @ 09:57) (132/95 - 145/93)  RR: 17 (04-05-20 @ 09:57) (16 - 18)  SpO2: 96% (04-05-20 @ 09:57) (96% - 100%)  Wt(kg): --  I&O's Summary      Appearance: Normal	  HEENT:   Normal oral mucosa, PERRL, EOMI	  Lymphatic: No lymphadenopathy  Cardiovascular: Normal S1 S2, No JVD, No murmurs, No edema  Respiratory: Lungs clear to auscultation	  Psychiatry: A & O x 3, Mood & affect appropriate  Gastrointestinal:  Soft, Non-tender, + BS	  Skin: No rashes, No ecchymoses, No cyanosis	  Neurologic: Non-focal  Extremities: Normal range of motion, No clubbing, cyanosis or edema  Vascular: Peripheral pulses palpable 2+ bilaterally    LABS:	 	              A/P: 84y.o. Male with Prostate CA on Lupron, GERD, Candidemia, now with COVID-19.    1. COVID - 19 - Patient without respiratory complaints. No SOB. no cough. No fever.    2. Candidemia - Appreciate ID recommendations s/p Caspofungin,   -Cont Fluconazole  mg Q24 through 04/22  -On Nystatin for thrush.  -Plan for DC to Rehab with IV Fluconazole as patient occasionally refusing PO fluconazole. If we can ensure that he will reliably take his medication, would be reasonable to DC him home on Fluconazole 400 mg po QD through 04/22/2020.   - Most recent PT eval (03/31) noted patient not a candidate for Rehab as unable to follow commands at that time. May need repeat PT eval now that mental status has improved.   -Discharge planning.     3. Metabolic encephalopathy - Likely delirium 2/2 recent infection (fungemia vs COVID) CT-Head negative.  - TSH, B12, Folate normal  -NA improved 147 on 04/03/2020. Encourage PO hydration.   -Repeat BMP tomorrow     4. HTN - BP elevated 145/93  -Start Norvasc 5 mg po QD    PPX - [facility]    Desean Koehler MD  Hospitalist Surge Attending  [facility] / [facility]  Cell: [phone]

## 2020-07-22 NOTE — PROGRESS NOTE ADULT - PROBLEM/PLAN-5
DISPLAY PLAN FREE TEXT
No lesions; no rash

## 2020-07-31 PROBLEM — C61 MALIGNANT NEOPLASM OF PROSTATE: Chronic | Status: ACTIVE | Noted: 2020-03-22

## 2020-08-11 ENCOUNTER — LABORATORY RESULT (OUTPATIENT)
Age: 85
End: 2020-08-11

## 2020-08-11 ENCOUNTER — APPOINTMENT (OUTPATIENT)
Dept: OTOLARYNGOLOGY | Facility: CLINIC | Age: 85
End: 2020-08-11
Payer: MEDICARE

## 2020-08-11 VITALS
HEART RATE: 61 BPM | BODY MASS INDEX: 20.4 KG/M2 | HEIGHT: 67 IN | DIASTOLIC BLOOD PRESSURE: 88 MMHG | SYSTOLIC BLOOD PRESSURE: 157 MMHG | WEIGHT: 130 LBS

## 2020-08-11 VITALS
HEIGHT: 67 IN | DIASTOLIC BLOOD PRESSURE: 88 MMHG | SYSTOLIC BLOOD PRESSURE: 157 MMHG | BODY MASS INDEX: 20.4 KG/M2 | WEIGHT: 130 LBS | HEART RATE: 61 BPM

## 2020-08-11 DIAGNOSIS — Z85.46 PERSONAL HISTORY OF MALIGNANT NEOPLASM OF PROSTATE: ICD-10-CM

## 2020-08-11 DIAGNOSIS — E04.9 NONTOXIC GOITER, UNSPECIFIED: ICD-10-CM

## 2020-08-11 PROCEDURE — 99205 OFFICE O/P NEW HI 60 MIN: CPT | Mod: 25

## 2020-08-11 PROCEDURE — 10005 FNA BX W/US GDN 1ST LES: CPT

## 2020-08-11 PROCEDURE — 31575 DIAGNOSTIC LARYNGOSCOPY: CPT

## 2020-08-11 NOTE — PHYSICAL EXAM
[de-identified] : Slightly enlarged right thyroid lobe, the inferior nodule is not readily palpable in the neck.  No LAD. [FreeTextEntry1] : No concerning lesions in the OC/OPx on inspection or palpation.\par  [Laryngoscopy Performed] : laryngoscopy was performed, see procedure section for findings [Normal] : no rashes

## 2020-08-11 NOTE — CONSULT LETTER
[Dear  ___] : Dear  [unfilled], [Consult Letter:] : I had the pleasure of evaluating your patient, [unfilled]. [Please see my note below.] : Please see my note below. [FreeTextEntry2] : Dr. Radha Ludwig\par [address], \par [place] [id number] [Sincerely,] : Sincerely, [Consult Closing:] : Thank you very much for allowing me to participate in the care of this patient.  If you have any questions, please do not hesitate to contact me. [FreeTextEntry3] : Dev\par \par Ghanshyam Arana MD FACS\par Division of Head and Neck Surgery\par Department of Otolaryngology \par [facility]\par \par  of Otolaryngology\par Assistant Professor of Surgery\par [employer] [facility]

## 2020-08-11 NOTE — PROCEDURE
[FreeTextEntry1] : US FNA R. substernal thyroid nodule [FreeTextEntry2] : R. substernal thyroid nodule [FreeTextEntry3] : After patient gave consent, the lesion was visualized with US with findings noted above.  The overlying skin was prepped with alcohol.  Under US guidance, a FNA was performed with multiple passes of a 22 gauge needle.  The specimen was sent to Cytology.  No hematoma.  Patient tolerated the procedure well.\par  [None] : none [Gag Reflex] : gag reflex preventing mirror examination [Serial Number: ___] : Serial Number: [unfilled] [Flexible Endoscope] : examined with the flexible endoscope [de-identified] : No lesions in the NPx, OPx, HPx or larynx.  VC are mobile, airway patent.\par

## 2020-08-11 NOTE — HISTORY OF PRESENT ILLNESS
[de-identified] : Referred by Dr. Ludwig for a thyroid mass noted on CT from [facility] on 7/28/2020.  The mass measures 5.9 cm and results in deviation of the trachea to the left.  No biopsy has been done. PT had COVID in March, 2020. PT was in the hospital for 21 days, never put on a vent.  Pt is recovering well from Covid and exercises regularly.   The f/up testing for COVID noted the thyroid mass.  Pt has known about the thyroid nodules for years and doesn't recall any biopsies being done.  PT is asymptomatic. \par PT has a hx of Prostate cancer and was treated with Lupron by Dr. Mcmahon.

## 2020-08-11 NOTE — DATA REVIEWED
[de-identified] : US today with approx. 6 cm mostly substernal thyroid nodule in the right inferior pole.  No nodularity in the left lobe. [de-identified] : CT Neck reports R. substernal thyroid mass with tracheal deviation to the left and slight compression of the trachea.

## 2020-08-31 ENCOUNTER — OUTPATIENT (OUTPATIENT)
Dept: OUTPATIENT SERVICES | Facility: HOSPITAL | Age: 85
LOS: 1 days | End: 2020-08-31

## 2020-08-31 VITALS
HEART RATE: 57 BPM | WEIGHT: 126.99 LBS | SYSTOLIC BLOOD PRESSURE: 150 MMHG | RESPIRATION RATE: 16 BRPM | OXYGEN SATURATION: 97 % | HEIGHT: 66.75 IN | DIASTOLIC BLOOD PRESSURE: 74 MMHG | TEMPERATURE: 97 F

## 2020-08-31 DIAGNOSIS — Z98.49 CATARACT EXTRACTION STATUS, UNSPECIFIED EYE: Chronic | ICD-10-CM

## 2020-08-31 DIAGNOSIS — E04.9 NONTOXIC GOITER, UNSPECIFIED: ICD-10-CM

## 2020-08-31 DIAGNOSIS — E04.1 NONTOXIC SINGLE THYROID NODULE: ICD-10-CM

## 2020-08-31 LAB
ANION GAP SERPL CALC-SCNC: 14 MMO/L — SIGNIFICANT CHANGE UP (ref 7–14)
BLD GP AB SCN SERPL QL: NEGATIVE — SIGNIFICANT CHANGE UP
BUN SERPL-MCNC: 18 MG/DL — SIGNIFICANT CHANGE UP (ref 7–23)
CALCIUM SERPL-MCNC: 9.3 MG/DL — SIGNIFICANT CHANGE UP (ref 8.4–10.5)
CHLORIDE SERPL-SCNC: 102 MMOL/L — SIGNIFICANT CHANGE UP (ref 98–107)
CO2 SERPL-SCNC: 25 MMOL/L — SIGNIFICANT CHANGE UP (ref 22–31)
CREAT SERPL-MCNC: 1.04 MG/DL — SIGNIFICANT CHANGE UP (ref 0.5–1.3)
GLUCOSE SERPL-MCNC: 85 MG/DL — SIGNIFICANT CHANGE UP (ref 70–99)
HCT VFR BLD CALC: 48.3 % — SIGNIFICANT CHANGE UP (ref 39–50)
HGB BLD-MCNC: 15.5 G/DL — SIGNIFICANT CHANGE UP (ref 13–17)
MCHC RBC-ENTMCNC: 26.5 PG — LOW (ref 27–34)
MCHC RBC-ENTMCNC: 32.1 % — SIGNIFICANT CHANGE UP (ref 32–36)
MCV RBC AUTO: 82.4 FL — SIGNIFICANT CHANGE UP (ref 80–100)
NRBC # FLD: 0 K/UL — SIGNIFICANT CHANGE UP (ref 0–0)
PLATELET # BLD AUTO: 185 K/UL — SIGNIFICANT CHANGE UP (ref 150–400)
PMV BLD: 11.6 FL — SIGNIFICANT CHANGE UP (ref 7–13)
POTASSIUM SERPL-MCNC: 4.2 MMOL/L — SIGNIFICANT CHANGE UP (ref 3.5–5.3)
POTASSIUM SERPL-SCNC: 4.2 MMOL/L — SIGNIFICANT CHANGE UP (ref 3.5–5.3)
RBC # BLD: 5.86 M/UL — HIGH (ref 4.2–5.8)
RBC # FLD: 14.7 % — HIGH (ref 10.3–14.5)
RH IG SCN BLD-IMP: POSITIVE — SIGNIFICANT CHANGE UP
SODIUM SERPL-SCNC: 141 MMOL/L — SIGNIFICANT CHANGE UP (ref 135–145)
WBC # BLD: 4.32 K/UL — SIGNIFICANT CHANGE UP (ref 3.8–10.5)
WBC # FLD AUTO: 4.32 K/UL — SIGNIFICANT CHANGE UP (ref 3.8–10.5)

## 2020-08-31 RX ORDER — SODIUM CHLORIDE 9 MG/ML
1000 INJECTION, SOLUTION INTRAVENOUS
Refills: 0 | Status: DISCONTINUED | OUTPATIENT
Start: 2020-09-03 | End: 2020-09-03

## 2020-08-31 NOTE — H&P PST ADULT - HISTORY OF PRESENT ILLNESS
85 M with PMH of prostate CA on lupron, GERD, presenting from home with fevers, weakness and altered mental status since yesterday. Patient says he was doing okay at home however his daughter felt like he wasn't at baseline so sent him in. History obtained from daughter Kayla () who states that patient was confused, had decreased po intake, and an episode of urinary incontinence. Patient also had an episode of diarrhea and found to have a temperature of 104 at home. Patient did not have any cough, shortness of breath or chest pain. No nausea, vomiting, abdominal pain. Patient lives with son and brother. No sick contacts however patient returned from [place] on 3/4.     In the ED, patient found to have T 98.7, HR 88, BP 92/63 ->115/59, RR 16 satting 95% on room air. Patient became tachypneic to 27-30 satting 94-95% on room air. Pt is a 84 yr old male scheduled for Right Substernal Thyroidectomy with Dr Arana tentatively 9/3/20 - pt seen in PST with daughter who assisted with  - pt is poor historian. Pt hx of COVID and hospitalization 3/21-4/5/20 and testing also noted thyroid goiter. Pt denies pain or swelling or dysphagia. Pt denies residual SOB or fever.   Pt hx prostate Ca Dx 15 yrs ago and received RT  - lupron injections up until last 11/19 when he went back to [facility] - pt returned 2/20 -    I

## 2020-08-31 NOTE — H&P PST ADULT - NSICDXPROBLEM_GEN_ALL_CORE_FT
PROBLEM DIAGNOSES  Problem: Thyroid goiter  Assessment and Plan: Pt scheduled for surgery and preop instructions including instructions for taking Famotidine and for Chlorhexidine use in showering on the day of surgery, given verbally and with use of  written materials, and patient confirming understanding of such instructions using  teach back method.  Pt to see PCP for [facility] - forms given

## 2020-09-01 ENCOUNTER — LABORATORY RESULT (OUTPATIENT)
Age: 85
End: 2020-09-01

## 2020-09-01 ENCOUNTER — APPOINTMENT (OUTPATIENT)
Dept: DISASTER EMERGENCY | Facility: CLINIC | Age: 85
End: 2020-09-01

## 2020-09-02 ENCOUNTER — TRANSCRIPTION ENCOUNTER (OUTPATIENT)
Age: 85
End: 2020-09-02

## 2020-09-02 NOTE — ASU PATIENT PROFILE, ADULT - NSTOBACCONEVERSMOKERY/N_GEN_A
Spoke with home health RN. They were seeing patient for fleming incision-doing well. Patient has now developed a yeast infection under the right breast. It is red/inflamed and moist. No open skin. Not foul smelling. RN told her to keep clean and dry.  Requesti No

## 2020-09-03 ENCOUNTER — APPOINTMENT (OUTPATIENT)
Dept: OTOLARYNGOLOGY | Facility: HOSPITAL | Age: 85
End: 2020-09-03

## 2020-09-03 ENCOUNTER — RESULT REVIEW (OUTPATIENT)
Age: 85
End: 2020-09-03

## 2020-09-03 ENCOUNTER — INPATIENT (INPATIENT)
Facility: HOSPITAL | Age: 85
LOS: 0 days | Discharge: HOME | End: 2020-09-04
Attending: OTOLARYNGOLOGY | Admitting: OTOLARYNGOLOGY
Payer: MEDICARE

## 2020-09-03 ENCOUNTER — TRANSCRIPTION ENCOUNTER (OUTPATIENT)
Age: 85
End: 2020-09-03

## 2020-09-03 VITALS
HEART RATE: 16 BPM | RESPIRATION RATE: 15 BRPM | OXYGEN SATURATION: 98 % | DIASTOLIC BLOOD PRESSURE: 96 MMHG | HEIGHT: 66.75 IN | TEMPERATURE: 98 F | SYSTOLIC BLOOD PRESSURE: 157 MMHG | WEIGHT: 126.99 LBS

## 2020-09-03 DIAGNOSIS — E04.9 NONTOXIC GOITER, UNSPECIFIED: ICD-10-CM

## 2020-09-03 DIAGNOSIS — Z98.49 CATARACT EXTRACTION STATUS, UNSPECIFIED EYE: Chronic | ICD-10-CM

## 2020-09-03 LAB — RH IG SCN BLD-IMP: POSITIVE — SIGNIFICANT CHANGE UP

## 2020-09-03 PROCEDURE — 88307 TISSUE EXAM BY PATHOLOGIST: CPT | Mod: 26

## 2020-09-03 RX ORDER — LABETALOL HCL 100 MG
5 TABLET ORAL ONCE
Refills: 0 | Status: COMPLETED | OUTPATIENT
Start: 2020-09-03 | End: 2020-09-03

## 2020-09-03 RX ORDER — HYDROMORPHONE HYDROCHLORIDE 2 MG/ML
0.25 INJECTION INTRAMUSCULAR; INTRAVENOUS; SUBCUTANEOUS
Refills: 0 | Status: DISCONTINUED | OUTPATIENT
Start: 2020-09-03 | End: 2020-09-03

## 2020-09-03 RX ORDER — ACETAMINOPHEN 500 MG
650 TABLET ORAL EVERY 6 HOURS
Refills: 0 | Status: DISCONTINUED | OUTPATIENT
Start: 2020-09-03 | End: 2020-09-04

## 2020-09-03 RX ORDER — FENTANYL CITRATE 50 UG/ML
25 INJECTION INTRAVENOUS
Refills: 0 | Status: DISCONTINUED | OUTPATIENT
Start: 2020-09-03 | End: 2020-09-03

## 2020-09-03 RX ORDER — ONDANSETRON 8 MG/1
4 TABLET, FILM COATED ORAL ONCE
Refills: 0 | Status: DISCONTINUED | OUTPATIENT
Start: 2020-09-03 | End: 2020-09-03

## 2020-09-03 RX ORDER — BENZOCAINE AND MENTHOL 5; 1 G/100ML; G/100ML
1 LIQUID ORAL ONCE
Refills: 0 | Status: COMPLETED | OUTPATIENT
Start: 2020-09-03 | End: 2020-09-03

## 2020-09-03 RX ORDER — INFLUENZA VIRUS VACCINE 15; 15; 15; 15 UG/.5ML; UG/.5ML; UG/.5ML; UG/.5ML
0.5 SUSPENSION INTRAMUSCULAR ONCE
Refills: 0 | Status: COMPLETED | OUTPATIENT
Start: 2020-09-03 | End: 2020-09-03

## 2020-09-03 RX ORDER — IBUPROFEN 200 MG
600 TABLET ORAL EVERY 6 HOURS
Refills: 0 | Status: DISCONTINUED | OUTPATIENT
Start: 2020-09-03 | End: 2020-09-04

## 2020-09-03 RX ADMIN — Medication 5 MILLIGRAM(S): at 11:39

## 2020-09-03 RX ADMIN — HYDROMORPHONE HYDROCHLORIDE 0.25 MILLIGRAM(S): 2 INJECTION INTRAMUSCULAR; INTRAVENOUS; SUBCUTANEOUS at 12:10

## 2020-09-03 RX ADMIN — BENZOCAINE AND MENTHOL 1 LOZENGE: 5; 1 LIQUID ORAL at 11:31

## 2020-09-03 NOTE — DISCHARGE NOTE PROVIDER - NSDCFUADDAPPT_GEN_ALL_CORE_FT
- Please follow up with Dr. Arana in clinic on Wednesday 09/09/2020 for neck MARIS removal. Please call [phone] for appointment time. - Please follow up with Dr. Arana in clinic on Wednesday 09/09/2020 for neck MARIS removal. Please call [phone] for appointment time.  - Please call Cardiologist Dr. Roe's office for follow up appointment - Please follow up with Dr. Arana in clinic on Wednesday 09/09/2020. Please call [phone] for appointment time.  - Please call Cardiologist Dr. Roe's office for follow up appointment

## 2020-09-03 NOTE — DISCHARGE NOTE PROVIDER - CARE PROVIDER_API CALL
Yasmeen Dev  OTOLARYNGOLOGY  [address] [place] [address]  Phone: [phone]  Fax: [phone]  Follow Up Time:

## 2020-09-03 NOTE — DISCHARGE NOTE PROVIDER - NSDCFUADDINST_GEN_ALL_CORE_FT
Wound Care: Keep the incision site clean and dry, do not get wet for at least 48 hours (2 days). Can shower after 48 hours. Let water run over incision site, pat dry, do not scrub.   Please record the amount of neck MARIS drainage as taught in the hospital.  Keep steri strips (white stickers) to remain in place, will fall off on its own  Pain Control: Alternate Tylenol 650mg and ibuprofen 600mg every 6 hours for pain. Do not take more than 4000mg of either medication in 24 hour period.   Activity: No heavy lifting or strenuous exercise for 2-4 weeks.   Diet: no restrictions, advance as tolerated.  Follow up with Dr. Arana as scheduled Wound Care: Keep the incision site clean and dry, do not get wet for at least 48 hours (2 days). Can shower after 48 hours. Let water run over incision site, pat dry, do not scrub.   Keep steri strips (white stickers) to remain in place, will fall off on its own  Pain Control: Alternate Tylenol 650mg and ibuprofen 600mg every 6 hours for pain. Do not take more than 4000mg of either medication in 24 hour period.   Activity: No heavy lifting or strenuous exercise for 2-4 weeks.   Diet: no restrictions, advance as tolerated.  Follow up with Dr. Arana as scheduled

## 2020-09-03 NOTE — DISCHARGE NOTE PROVIDER - NSDCMRMEDTOKEN_GEN_ALL_CORE_FT
acetaminophen 325 mg oral tablet: 2 tab(s) orally every 6 hours, As needed, Mild to moderate pain (1 - 6)  ibuprofen 600 mg oral tablet: 1 tab(s) orally every 6 hours, As needed, Severe Pain (7 - 10)
Universal Safety Interventions

## 2020-09-03 NOTE — DISCHARGE NOTE PROVIDER - NSDCCPCAREPLAN_GEN_ALL_CORE_FT
PRINCIPAL DISCHARGE DIAGNOSIS  Diagnosis: Thyroid goiter  Assessment and Plan of Treatment: - Please follow up with Dr. Arana as scheduled

## 2020-09-03 NOTE — DISCHARGE NOTE PROVIDER - HOSPITAL COURSE
84 year old male with a PMHx of prostate cancer s/p radiation therapy and right thyroid goiter s/p right hemithyroidectomy on 09/03/2020. Had MARIS drains that were monitored for output to prevent seroma formation. Patient's pain is controlled, tolerating regular diet without any issue. Patient was cleared for discharge home by Dr. Arana on 09/04/2020. All prescriptions were sent to a pharmacy that was agreed on with the patient. 84 year old male with a PMHx of prostate cancer s/p radiation therapy and right thyroid goiter s/p right hemithyroidectomy on 09/03/2020. Had MARIS drains that were monitored for output to prevent seroma formation. MARIS removed on 09/04/2020. Patient's pain is controlled, tolerating regular diet without any issue. Patient was cleared for discharge home by Dr. Arana on 09/04/2020. All prescriptions were sent to a pharmacy that was agreed on with the patient.

## 2020-09-04 ENCOUNTER — TRANSCRIPTION ENCOUNTER (OUTPATIENT)
Age: 85
End: 2020-09-04

## 2020-09-04 VITALS
TEMPERATURE: 98 F | HEART RATE: 81 BPM | RESPIRATION RATE: 16 BRPM | OXYGEN SATURATION: 100 % | DIASTOLIC BLOOD PRESSURE: 72 MMHG | SYSTOLIC BLOOD PRESSURE: 131 MMHG

## 2020-09-04 PROCEDURE — 99239 HOSP IP/OBS DSCHRG MGMT >30: CPT

## 2020-09-04 RX ORDER — ACETAMINOPHEN 500 MG
2 TABLET ORAL
Qty: 40 | Refills: 0
Start: 2020-09-04 | End: 2020-09-08

## 2020-09-04 RX ORDER — IBUPROFEN 200 MG
1 TABLET ORAL
Qty: 20 | Refills: 0
Start: 2020-09-04 | End: 2020-09-08

## 2020-09-04 NOTE — DISCHARGE NOTE NURSING/CASE MANAGEMENT/SOCIAL WORK - PATIENT PORTAL LINK FT
You can access the FollowMyHealth Patient Portal offered by [facility] by registering at the following website: [email or web address]. By joining [employer]’s FollowMyHealth portal, you will also be able to view your health information using other applications (apps) compatible with our system.

## 2020-09-04 NOTE — CONSULT NOTE ADULT - ASSESSMENT
Thyroid goiter  s/p hemithyroidectomy   fu with ENT    post op CV care  stable clinically     fu with me as outpt

## 2020-09-04 NOTE — PROGRESS NOTE ADULT - SUBJECTIVE AND OBJECTIVE BOX
84M s/p R hemithyroidectomy for large goiter    Patient removed MARIS overnight, replaced and holding suction. No other events, no complaints this AM. Tolerating diet, ambulating.     Vital Signs Last 24 Hrs  T(C): 36.6 (04 Sep 2020 05:12), Max: 36.7 (03 Sep 2020 10:25)  T(F): 97.8 (04 Sep 2020 05:12), Max: 98.1 (03 Sep 2020 10:25)  HR: 72 (04 Sep 2020 05:12) (63 - 100)  BP: 139/78 (04 Sep 2020 05:12) (134/73 - 179/99)  BP(mean): 75 (03 Sep 2020 17:26) (75 - 129)  RR: 16 (04 Sep 2020 05:12) (12 - 25)  SpO2: 97% (04 Sep 2020 05:12) (89% - 100%)    PE:  Neck: soft, flat, incision c/d/i, MARIS 50/24h  Head: NCAT

## 2020-09-04 NOTE — PROVIDER CONTACT NOTE (OTHER) - REASON
Patient confused at times, removed MARIS drain from Neck and IV, refuses new IV placement. Patient confused at times, removed MARIS drain bulb and IV, refuses new IV placement.

## 2020-09-04 NOTE — CONSULT NOTE ADULT - SUBJECTIVE AND OBJECTIVE BOX
CHIEF COMPLAINT:Patient is a 84y old  Male who presents with a chief complaint of Thyroid goiter (03 Sep 2020 13:02)      HISTORY OF PRESENT ILLNESS:    84 male with history as below , known to me from office for pre op eval is now s/p   resection of   Large R goiter with sub-sternal component.	  denies any chest pain, sob, palpitation, dizziness or syncope.    PAST MEDICAL & SURGICAL HISTORY:  Nontoxic goiter  Prostate cancer  H/O cataract extraction          MEDICATIONS:        acetaminophen   Tablet .. 650 milliGRAM(s) Oral every 6 hours PRN  ibuprofen  Tablet. 600 milliGRAM(s) Oral every 6 hours PRN        influenza   Vaccine 0.5 milliLiter(s) IntraMuscular once      FAMILY HISTORY:      Non-contributory    SOCIAL HISTORY:    No tobacco, drugs or etoh    Allergies    No Known Allergies    Intolerances    	    REVIEW OF SYSTEMS:  as above  The rest of the 14 points ROS reviewed and except above they are unremarkable.        PHYSICAL EXAM:  T(C): 36.6 (09-04-20 @ 05:12), Max: 36.7 (09-03-20 @ 10:25)  HR: 72 (09-04-20 @ 05:12) (63 - 100)  BP: 139/78 (09-04-20 @ 05:12) (134/73 - 179/99)  RR: 16 (09-04-20 @ 05:12) (12 - 25)  SpO2: 97% (09-04-20 @ 05:12) (89% - 100%)  Wt(kg): --  I&O's Summary    03 Sep 2020 07:01  -  04 Sep 2020 07:00  --------------------------------------------------------  IN: 1140 mL / OUT: 500 mL / NET: 640 mL        Appearance: Normal	  HEENT:   no gross abnormality   Cardiovascular: Normal S1 S2,    Murmur:   Neck: JVP normal  Respiratory: Lungs clear   Gastrointestinal:  Soft, Non-tender  Skin: normal   Neuro: No gross deficits.   Psychiatry:  Mood & affect flat  Ext: No edema    LABS/DATA:    TELEMETRY: 	    ECG:  	   	  CARDIAC MARKERS:                        proBNP:   Lipid Profile:   HgA1c:   TSH:

## 2020-09-04 NOTE — DISCHARGE NOTE NURSING/CASE MANAGEMENT/SOCIAL WORK - NSDCFUADDAPPT_GEN_ALL_CORE_FT
- Please follow up with Dr. Arana in clinic on Wednesday 09/09/2020. Please call [phone] for appointment time.  - Please call Cardiologist Dr. Roe's office for follow up appointment

## 2020-09-04 NOTE — DISCHARGE NOTE NURSING/CASE MANAGEMENT/SOCIAL WORK - NSDCPNINST_GEN_ALL_CORE
Please NOTIFY MD for any of the following s/s: S/S infection (Fever >100.4, chills, increased redness, increased bleeding, pus-like drainage from incision line), uncontrolled pain not relieved by pain medications, persistent nausea/vomiting or inability to tolerate diet.

## 2020-09-04 NOTE — PROVIDER CONTACT NOTE (OTHER) - ASSESSMENT
Patient confused at times, removed MARIS drain from Neck and IV, scant bleeding noted at neck  noted, refuses new IV placement. MARIS tubing still sutured in place, bulb tubing ripped out by patient.

## 2020-09-08 PROBLEM — E04.9 NONTOXIC GOITER, UNSPECIFIED: Chronic | Status: ACTIVE | Noted: 2020-08-31

## 2020-09-10 ENCOUNTER — APPOINTMENT (OUTPATIENT)
Dept: OTOLARYNGOLOGY | Facility: CLINIC | Age: 85
End: 2020-09-10
Payer: MEDICARE

## 2020-09-10 PROCEDURE — 99024 POSTOP FOLLOW-UP VISIT: CPT

## 2020-09-10 NOTE — REASON FOR VISIT
[Post-Operative Visit] : a post-operative visit [FreeTextEntry2] : s/p right substernal thyroidectomy 9/3/2020

## 2020-09-10 NOTE — HISTORY OF PRESENT ILLNESS
[de-identified] : Mr. Sequeira is a 85 yo male accompany by his daughter. He is s/p right substernal thyroidectomy 9/3/2020 enlarged thyroid nodule. Presents today for surgical incision assessment and suture removal. Reports feeling well, afrebrile. Denies pain, dysphagia, dysphonia and or dyspnea.\par  Denies recent cough, fever, chill, or changes in taste or smell  [None] : The patient is currently asymptomatic.

## 2020-09-10 NOTE — PHYSICAL EXAM
[de-identified] : anterior neck surgical incision well approximated. No warmth, drainage, or redness noted.

## 2021-06-05 NOTE — DIETITIAN INITIAL EVALUATION ADULT. - PROBLEM/PLAN-6
DISPLAY PLAN FREE TEXT

## 2021-12-02 NOTE — PHYSICAL THERAPY INITIAL EVALUATION ADULT - ADDITIONAL COMMENTS
unable to attain accurate social history/prior level of function as patient is confused (A&Ox1) and only responded in yes/no responses during session. As per H&P, patient was admitted from home, where he lives with his son and brother semi-retired

## 2022-03-31 ENCOUNTER — APPOINTMENT (OUTPATIENT)
Dept: UROLOGY | Facility: CLINIC | Age: 87
End: 2022-03-31
Payer: MEDICARE

## 2022-03-31 VITALS
HEIGHT: 67 IN | DIASTOLIC BLOOD PRESSURE: 84 MMHG | BODY MASS INDEX: 21.19 KG/M2 | TEMPERATURE: 98 F | WEIGHT: 135 LBS | SYSTOLIC BLOOD PRESSURE: 120 MMHG

## 2022-03-31 DIAGNOSIS — C61 MALIGNANT NEOPLASM OF PROSTATE: ICD-10-CM

## 2022-03-31 PROCEDURE — 99213 OFFICE O/P EST LOW 20 MIN: CPT

## 2022-03-31 NOTE — ASSESSMENT
[FreeTextEntry1] : PSA level remains low.  Urinary symptoms have subsided.  Residual urine volume is acceptable.  He should try to follow-up on annual basis.  Risks of prostate cancer recurrence discussed.  He will try to follow-up in 1 year.\par \par Bret Mcmahon MD, FACS\par [facility]\par  of Urology\par \par [address], [address]\[place] [address]\par \par [address], [address]\[place] [address]\par \par Tel: [phone]\par Fax: [phone]

## 2022-03-31 NOTE — PHYSICAL EXAM
[General Appearance - Well Developed] : well developed [General Appearance - Well Nourished] : well nourished [Normal Appearance] : normal appearance [Well Groomed] : well groomed [General Appearance - In No Acute Distress] : no acute distress [Abdomen Soft] : soft [Abdomen Tenderness] : non-tender [Costovertebral Angle Tenderness] : no ~M costovertebral angle tenderness [Urethral Meatus] : meatus normal [Urinary Bladder Findings] : the bladder was normal on palpation [Scrotum] : the scrotum was normal [Testes Mass (___cm)] : there were no testicular masses [Prostate Absent] : was absent [] : no respiratory distress [Respiration, Rhythm And Depth] : normal respiratory rhythm and effort [Exaggerated Use Of Accessory Muscles For Inspiration] : no accessory muscle use

## 2022-03-31 NOTE — LETTER BODY
[Dear  ___] : Dear  [unfilled], [Attached please find my note.] : Attached please find my note. [Thank you very much for allowing me to participate in the care of this patient. If you have any questions, please do not hesitate to contact me] : Thank you very much for allowing me to participate in the care of this patient. If you have any questions, please do not hesitate to contact me. [FreeTextEntry1] : [facility]\par [address] \[place], [place], [address]  \par [phone] \par \par

## 2022-03-31 NOTE — HISTORY OF PRESENT ILLNESS
[FreeTextEntry1] : Maciel Sequeira is an 86 year old man who is seen for prostate cancer with biochemical recurrence.  He is seen today with his daughter.  He was last seen in 2019.  Nocturia is twice.  He had urinary frequency and urgency recently which has subsided.  He is not not on any medication.  In review of labs that showed that in March 2022 creatinine was 1.14, urinalysis was normal, A1c was 5.6 and PSA level was 0.8.  He lives between [place] and [place].  Residual urine volume today was about 100 cc.\par \par The last Lupron injection was given in September 2017.  There is no weight loss or bone pain. Lupron injection was started when PSA level reached 8. In 2005, he underwent XRT for [place] 8 prostate cancer.

## 2023-02-09 NOTE — ASU PATIENT PROFILE, ADULT - NS PRO PASSIVE SMOKE EXP
Show Applicator Variable?: Yes Consent: The patient's consent was obtained including but not limited to risks of crusting, scabbing, blistering, scarring, darker or lighter pigmentary change, recurrence, incomplete removal and infection. Detail Level: Zone Number Of Freeze-Thaw Cycles: 1 freeze-thaw cycle Render Post-Care Instructions In Note?: no Duration Of Freeze Thaw-Cycle (Seconds): 10 Post-Care Instructions: I reviewed with the patient in detail post-care instructions. Patient is to wear sunprotection, and avoid picking at any of the treated lesions. Pt may apply Vaseline to crusted or scabbing areas. No

## 2023-04-26 NOTE — PATIENT PROFILE ADULT - BRADEN FRICTION AND SHEAR
Topical Steroids Counseling: I discussed with the patient that prolonged use of topical steroids can result in the increased appearance of superficial blood vessels (telangiectasias), lightening (hypopigmentation) and thinning of the skin (atrophy).  Patient understands to avoid using high potency steroids in skin folds, the groin or the face.  The patient verbalized understanding of the proper use and possible adverse effects of topical steroids.  All of the patient's questions and concerns were addressed. (3) no apparent problem
